# Patient Record
Sex: FEMALE | Race: WHITE | NOT HISPANIC OR LATINO | Employment: FULL TIME | URBAN - METROPOLITAN AREA
[De-identification: names, ages, dates, MRNs, and addresses within clinical notes are randomized per-mention and may not be internally consistent; named-entity substitution may affect disease eponyms.]

---

## 2017-05-18 ENCOUNTER — TELEPHONE (OUTPATIENT)
Dept: SURGERY | Facility: CLINIC | Age: 37
End: 2017-05-18

## 2017-05-30 ENCOUNTER — TELEPHONE (OUTPATIENT)
Dept: SURGERY | Facility: CLINIC | Age: 37
End: 2017-05-30

## 2017-05-31 ENCOUNTER — TELEPHONE (OUTPATIENT)
Dept: SURGERY | Facility: CLINIC | Age: 37
End: 2017-05-31

## 2017-06-19 ENCOUNTER — TELEPHONE (OUTPATIENT)
Dept: SURGERY | Facility: CLINIC | Age: 37
End: 2017-06-19

## 2017-06-19 ENCOUNTER — OFFICE VISIT (OUTPATIENT)
Dept: SURGERY | Facility: CLINIC | Age: 37
End: 2017-06-19

## 2017-06-19 VITALS
HEIGHT: 67 IN | SYSTOLIC BLOOD PRESSURE: 138 MMHG | BODY MASS INDEX: 40.4 KG/M2 | WEIGHT: 257.4 LBS | TEMPERATURE: 97.9 F | HEART RATE: 68 BPM | OXYGEN SATURATION: 98 % | DIASTOLIC BLOOD PRESSURE: 83 MMHG

## 2017-06-19 DIAGNOSIS — Z80.3 FH: BREAST CANCER: Primary | ICD-10-CM

## 2017-06-19 DIAGNOSIS — D68.51 FACTOR V LEIDEN CARRIER (HCC): ICD-10-CM

## 2017-06-19 DIAGNOSIS — N60.12 DIFFUSE CYSTIC MASTOPATHY, LEFT: ICD-10-CM

## 2017-06-19 PROCEDURE — 99243 OFF/OP CNSLTJ NEW/EST LOW 30: CPT | Performed by: SURGERY

## 2017-06-19 RX ORDER — FLUOXETINE 10 MG/1
CAPSULE ORAL
Refills: 3 | COMMUNITY
Start: 2017-03-22 | End: 2018-11-02

## 2017-06-19 NOTE — TELEPHONE ENCOUNTER
Pt informed of appts:  PETER 9-29-17 9:30 arrival Mammo/MRI  Pt instructed to bring at least 2 years of mammo films   Prescreened MRI/good on all questions  Dr. Bass 10-6-17 11:45 arrival

## 2017-06-19 NOTE — PROGRESS NOTES
Chief Complaint: Marianne Sawyer is a 37 y.o. female who was seen in consultation at the request of Francis Butterfield MD  for family history breast cancer    History of Present Illness:  Patient presents with family history breast cancer and previous breast biopsy.. She noted no new masses, skin changes, nipple discharge, nipple changes prior to her most recent imaging.  Her most recent imaging includes the following: [facility] bilateral screening mammogram September 20, 2016 fatty replaced breast, BI-RADS 1.    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  Cores returned as fibrocystic change with calcifications.    She has her uterus and ovaries removed for fibroids and bleeding, is postmenopausal, and has taken estradiol for the past 4 years since her 2012 hysterectomy.  Her family history includes the following:     She has one daughter, one son, 2 sisters, one maternal aunt, 4 paternal aunts.  Her maternal grandmother had breast cancer at age 70, her mother had breast cancer in her 50s.  One sister had breast cancer at age 47.  The other sister had breast cancer age 48.  One of her sisters is a patient of ours named Natalia Curry.  She is here for evaluation.    Review of Systems:  Review of Systems   Psychiatric/Behavioral: Positive for depression.   All other systems reviewed and are negative.       Past Medical and Surgical History:  Breast Biopsy History:  Patient has had the following breast biopsies:2/2013 A FIBROCYSTIC DISEASE WITH MICROCALCIFICATIONS.   Breast Cancer HIstory:  Patient does not have a past medical history of breast cancer.  Breast Operations, and year:  None   Obstetric/Gynecologic History:  Age menstrual periods began: 11 yrs old   Patient is postmenopausal due to removal of her uterus and both ovaries in the following year: 3/2012   Number of pregnancies: 2  Number of  live births: 2  Number of abortions or miscarriages: 0  Age of delivery of first child: 23 yrs old   Patient did not breast feed.  Length of time taking birth control pills: 19 yrs   Patient took hormone replacement during the following dates: Estradiol 4 yrs  Patient had uterus and ovaries removed     Past Surgical History:   Procedure Laterality Date   • BREAST BIOPSY      benign    •  SECTION     • DILATATION AND CURETTAGE     • LASER ABLATION      eye-bilateral   • LASIK     • SKIN LESION EXCISION      benign-back   • TOTAL ABDOMINAL HYSTERECTOMY WITH SALPINGO OOPHORECTOMY         Past Medical History:   Diagnosis Date   • Depression    • Factor 5 Leiden mutation, heterozygous    • Mastodynia    • Mood disorder        Prior Hospitalizations, other than for surgery or childbirth, and year:  None     Social History     Social History   • Marital status:      Spouse name: N/A   • Number of children: N/A   • Years of education: N/A     Occupational History   • Not on file.     Social History Main Topics   • Smoking status: Never Smoker   • Smokeless tobacco: Not on file   • Alcohol use Yes      Comment: beer occassionally   • Drug use: No   • Sexual activity: Not on file     Other Topics Concern   • Not on file     Social History Narrative   • No narrative on file     Patient is .  Patient is employed full time with the following occupation: RN  Patient drinks 1 servings of caffeine per day.    Family History:  Family History   Problem Relation Age of Onset   • Asthma Mother    • COPD Mother    • Deep vein thrombosis Mother    • Depression Mother    • Breast cancer Mother      50s   • Clotting disorder Father    • Deep vein thrombosis Father    • Hypertension Father    • Hyperlipidemia Father    • Clotting disorder Sister    • Depression Sister    • Breast cancer Sister 47   • Breast cancer Paternal Aunt      60-70s   • Breast cancer Maternal Grandmother      70s   • Esophageal cancer  "Maternal Grandfather      90s   • Lung cancer Paternal Grandmother    • Breast cancer Sister 48       Vital Signs:  /83 (BP Location: Left arm, Patient Position: Sitting, Cuff Size: Large Adult)  Pulse 68  Temp 97.9 °F (36.6 °C) (Temporal Artery )   Ht 67\" (170.2 cm)  Wt 257 lb 6.4 oz (117 kg)  SpO2 98%  BMI 40.31 kg/m2     Medications:    Current Outpatient Prescriptions:   •  FLUoxetine (PROzac) 10 MG capsule, TK 1 C PO ONCE D, Disp: , Rfl: 3     Allergies:  Allergies   Allergen Reactions   • Cleocin [Clindamycin Hcl] Anaphylaxis   • Sulfa Antibiotics Rash       Physical Examination:  /83 (BP Location: Left arm, Patient Position: Sitting, Cuff Size: Large Adult)  Pulse 68  Temp 97.9 °F (36.6 °C) (Temporal Artery )   Ht 67\" (170.2 cm)  Wt 257 lb 6.4 oz (117 kg)  SpO2 98%  BMI 40.31 kg/m2  General Appearance:  Patient is in no distress.  She is well kept and has an morbidly obese build.   Psychiatric:  Patient with appropriate mood and affect. Alert and oriented to self, time, and place.    Breast, RIGHT:  large sized, symmetric with the contralateral side.  Breast skin is without erythema, edema, rashes.  There are no visible abnormalities upon inspection during the arm-raising maneuver or with hands on hips in the sitting position. There is no nipple retraction, discharge or nipple/areolar skin changes.There are no masses palpable in the sitting or supine positions.    Breast, LEFT:  large sized, symmetric with the contralateral side.  Breast skin is without erythema, edema, rashes.  There are no visible abnormalities upon inspection during the arm-raising maneuver or with hands on hips in the sitting position. There is no nipple retraction, discharge or nipple/areolar skin changes.There are no masses palpable in the sitting or supine positions.    Lymphatic:  There is no axillary, cervical, infraclavicular, or supraclavicular adenopathy bilaterally.  Eyes:  Pupils are round and reactive to " light.  Cardiovascular:  Heart rate and rhythm are regular.  Respiratory:  Lungs are clear bilaterally with no crackles or wheezes in any lung field.  Gastrointestinal:  Abdomen is soft, nondistended, and nontender.    Musculoskeletal:  Good strength in all 4 extremities.   There is good range of motion in both shoulders.    Skin:  No new skin lesions or rashes on the skin excluding the breast (see breast exam above).        Imagin13  [facility] [place]  SCREENING BILATERAL MAMMOGRAM  GERALD, CATRACHITO R  Scattered fibrogladular densities.   Finding 1: round mass upper inner quadrant right breast.   Finding 2: pleomorphic calcifications upper outer quadrant of the left breast.   IMPRESSION:   Finding 1: requires additional evaluation.   Finding 2: require additional evaluation.   BIRADS Category 0    13 [facility] [place]  BILATERAL DIGITAL SPOTS-BILATERAL  GERALD, CATRACHITO R  Scattered fibrogladular densities.   Finding 1: additional evaluation round mass upper inner region right breast does persist. This could possibly represent an intramammary lymph node.   Finding 2: calcifications in the upper outer region of the left breast. Calcifiactuib have a suspicious appearance for malignancy and biopsy is recommended.   ULTRASOUND FINDINGS:   Finding 1: no discrete solid or cystic mass or area of abnormal shadowing. Probably benign. Recommend a right diagnostic mammogram in 6 months.   Finding 2: suspicious  BI-RADS Category 4    13  [facility]  MIKIE MAMMOGRAM DIAG GERALD, CATRACHITO   Scattered fibrogladular densities.   BI-RADS 2=Benign Findings     14  [facility]  UNI MARLEEN MAMMO GERALD, CATRACHITO  Scattered fibrogladular densities. BI-RADS 1-Negative     09/15/14  [facility]  MIKIE SCREENING MAMMO GERALD, CATRACHITO  PREDOMINANTLY FATTY BI-RADS 2    09/21/15  [facility]   SCREEN MAMMO   GERALD, CATRACHITO   BREAST COMPSITION: 1,  predominately fatty    09/28/16  [facility] MIKIE SCR MAMMO  CATRACHITO DUARTE   BREAST COMPOSITION: 1 PREDOMINATLEY FATTY        Pathology:  02/25/13 [facility]  STEREO MAMMOTOME LT   CATRACHITO DUARTE  An 11-gauge mammotome. Ultra-gel trenton system. Post-clip stereo views and mammogram revealed the clip to be in adequate position. Six month mammogram recommended for follow up.     03/04/13  [facility]   SURGICAL PATHOLOGY  CATRACHITO DUARTE.   FINAL PATHOLOGIC DIAGNOSIS  A FIBROCYSTIC DISEASE WITH MICROCALCIFICATIONS.     Procedures:      Assessment:   Diagnosis Plan   1. FH: breast cancer  MRI Breast Bilateral With & Without Contrast    Mammo Screening Bilateral With CAD    Ambulatory Referral to Hematology / Oncology   2. Diffuse cystic mastopathy, left  MRI Breast Bilateral With & Without Contrast    Mammo Screening Bilateral With CAD    Ambulatory Referral to Hematology / Oncology   3. Factor V Leiden carrier  MRI Breast Bilateral With & Without Contrast    Mammo Screening Bilateral With CAD    Ambulatory Referral to Hematology / Oncology   1-  Maternal grandmother age 70, mother age 55, sister #1 age 47, Sister #2 age 48.  Sister Natalia Curry, 1-8-68- is a patient of ours who had [facility] genetic testing that was a breast-ovarian cancer panel and negative for mutation     2-  Left breast calcifications March 2013, fibrocystic change with calcifications on stereotactic biopsy.    3-  Factor V heterozygote, inherited from her father.  Father had deep venous thrombosis.  Patient has not had a DVT or pulmonary embolus.  She did take heparin with a pregnancy.  She did not take heparin or Lovenox perioperatively surrounding her hysterectomy.      Plan:  We reviewed her imaging, history, exam, pathology, and family history together today. Her most significant risk factors for breast cancer include the following: family history  I calculated her lifetime risk of breast cancer  using the mastery of breast surgery  model as: 30-56%  Her 5 year risk for developing breast cancer is 2.8-8.7%    With a lifetime risk of breast cancer greater than 20%, the current recommendations for screening include annual mammography and annual MRI, with clinical examination. She is interested in pursuing this.    We also discussed that for a Josselin model 5 year risk greater than 1.7% or LCIS, and a life expectancy > 10 years, that we recommend risk reduction counseling with the medical oncologists about chemopreventive agents such as tamoxifen, raloxifene, or exemestane.  She was interested in pursuing this as well.    She is going to check her insurance and let us know whom she would like to see from medical oncology.    Son, her sister, who is a patient of ours,  is seeing Dr. Main from medical oncology, and he and his office are working on getting her into a clinical trial at [facility] based on her family history and lack of genetic mutation.    I have asked her to continue her self breast exam and to call us in the interim with any concerns and we'd be happy to see her back sooner.  Of note she does take estradiol at present.      Rocío Bass MD        We have spent 40 minutes in visit today, 25 in face to face .      Next Appointment:  Return for Next scheduled follow up, after imaging.      EMR Dragon/transcription disclaimer:    Much of this encounter note is an electronic transcription/translocation of spoken language to printed text.  The electronic translation of spoken language may permit erroneous, or at times, nonsensical words or phrases to be inadvertently transcribed.  Although I have reviewed the note from such areas, some may still exist.

## 2017-06-21 ENCOUNTER — TELEPHONE (OUTPATIENT)
Dept: SURGERY | Facility: CLINIC | Age: 37
End: 2017-06-21

## 2017-06-21 NOTE — TELEPHONE ENCOUNTER
Pt wants to have an appointment with .   [place].    Is office staff asked that I ask Dr MICHAEL if she will order Genetic testing?  His office will check with Dr Main when he returns and let us know about an appointment.    [email or web address]

## 2017-06-22 ENCOUNTER — TELEPHONE (OUTPATIENT)
Dept: SURGERY | Facility: CLINIC | Age: 37
End: 2017-06-22

## 2017-06-22 DIAGNOSIS — Z80.3 FAMILY HISTORY OF BREAST CANCER: Primary | ICD-10-CM

## 2017-06-23 ENCOUNTER — TELEPHONE (OUTPATIENT)
Dept: SURGERY | Facility: CLINIC | Age: 37
End: 2017-06-23

## 2017-06-23 NOTE — TELEPHONE ENCOUNTER
office requesting pt to have Genetic's testing done.   put order in, I have forward thru [facility] to have this scheduled with Genetic's at [facility]

## 2017-06-30 ENCOUNTER — APPOINTMENT (OUTPATIENT)
Dept: LAB | Facility: HOSPITAL | Age: 37
End: 2017-06-30

## 2017-06-30 ENCOUNTER — APPOINTMENT (OUTPATIENT)
Dept: ONCOLOGY | Facility: CLINIC | Age: 37
End: 2017-06-30

## 2017-07-24 ENCOUNTER — TELEPHONE (OUTPATIENT)
Dept: SURGERY | Facility: CLINIC | Age: 37
End: 2017-07-24

## 2017-07-24 NOTE — TELEPHONE ENCOUNTER
"  Followed up with  office today regarding  Ms. Sawyer\"s appointment & ,was informed by his office staff they are unable to make any appointment for Ms. Sawyer at this time, unless she has a cancer diagnosis or pending Genetic which is scheduled for 8-15-17    We see patient back 10-6-17 for a f/u mammo + MRI    Thanks  K      "

## 2017-08-15 ENCOUNTER — CLINICAL SUPPORT (OUTPATIENT)
Dept: OTHER | Facility: HOSPITAL | Age: 37
End: 2017-08-15

## 2017-08-15 DIAGNOSIS — Z80.3 FAMILY HISTORY OF MALIGNANT NEOPLASM OF BREAST: Primary | ICD-10-CM

## 2017-08-15 PROCEDURE — G0463 HOSPITAL OUTPT CLINIC VISIT: HCPCS

## 2017-09-15 ENCOUNTER — TELEPHONE (OUTPATIENT)
Dept: SURGERY | Facility: CLINIC | Age: 37
End: 2017-09-15

## 2017-09-15 NOTE — TELEPHONE ENCOUNTER
Precert obtained for MRI 9-29-17 avlid until 12-14-17 [facility] as per Zenaida LARA [id number]

## 2017-09-29 ENCOUNTER — HOSPITAL ENCOUNTER (OUTPATIENT)
Dept: MRI IMAGING | Facility: HOSPITAL | Age: 37
Discharge: HOME | End: 2017-09-29
Attending: SURGERY | Admitting: SURGERY

## 2017-09-29 ENCOUNTER — HOSPITAL ENCOUNTER (OUTPATIENT)
Dept: MAMMOGRAPHY | Facility: HOSPITAL | Age: 37
Discharge: HOME | End: 2017-09-29
Attending: SURGERY

## 2017-09-29 DIAGNOSIS — Z80.3 FH: BREAST CANCER: ICD-10-CM

## 2017-09-29 DIAGNOSIS — N60.12 DIFFUSE CYSTIC MASTOPATHY, LEFT: ICD-10-CM

## 2017-09-29 DIAGNOSIS — D68.51 FACTOR V LEIDEN CARRIER (HCC): ICD-10-CM

## 2017-09-29 PROCEDURE — 25510000001 GADOBENATE DIMEGLUMINE 529 MG/ML SOLUTION: Performed by: SURGERY

## 2017-09-29 PROCEDURE — G0202 SCR MAMMO BI INCL CAD: HCPCS

## 2017-09-29 PROCEDURE — C8908 MRI W/O FOL W/CONT, BREAST,: HCPCS

## 2017-09-29 PROCEDURE — A9577 INJ MULTIHANCE: HCPCS | Performed by: SURGERY

## 2017-09-29 PROCEDURE — 0159T HC MRI BREAST COMPUTER ANALYSIS: CPT

## 2017-09-29 RX ADMIN — GADOBENATE DIMEGLUMINE 20 ML: 529 INJECTION, SOLUTION INTRAVENOUS at 10:46

## 2017-10-03 ENCOUNTER — TELEPHONE (OUTPATIENT)
Dept: SURGERY | Facility: CLINIC | Age: 37
End: 2017-10-03

## 2017-10-03 DIAGNOSIS — R92.8 ABNORMAL MRI, BREAST: Primary | ICD-10-CM

## 2017-10-03 NOTE — TELEPHONE ENCOUNTER
Screening mammogram bilateral [facility] September 29, 2017.  BI-RADS 2.  Scattered fibroglandular densities.  MRI breast bilateral [facility] September 29, 2017 Dr. Hossein David.  Area of enhancement of questionable character with ill-defined marked neurologic features at the 4:00 right breast, 13 cm from the nipple location correlation with diagnostic mammogram and ultrasound.  No suspicious findings in the left breast.  BI-RADS 0.    I will arrange for a right mammogram and ultrasound prior to her visit back with me.    October 6, 2017 right diagnostic mammogram and right breast ultrasound.  Attention to the 4 o'clock position.  No architectural distortion, mass or area of increased density has developed.  Scattered fiber glandular densities.  Ultrasound in her inferior right breast no cystic or solid mass identified.  At the 3:00, 13 cm from the nipple location a small area of acoustic shadowing noted secondary to normal soft tissue interface.  BI-RADS 2 with return to normal routine mammogram and MRI.

## 2017-10-04 ENCOUNTER — TELEPHONE (OUTPATIENT)
Dept: SURGERY | Facility: CLINIC | Age: 37
End: 2017-10-04

## 2017-10-06 ENCOUNTER — TELEPHONE (OUTPATIENT)
Dept: SURGERY | Facility: CLINIC | Age: 37
End: 2017-10-06

## 2017-10-06 ENCOUNTER — HOSPITAL ENCOUNTER (OUTPATIENT)
Dept: ULTRASOUND IMAGING | Facility: HOSPITAL | Age: 37
Discharge: HOME | End: 2017-10-06
Attending: SURGERY

## 2017-10-06 ENCOUNTER — HOSPITAL ENCOUNTER (OUTPATIENT)
Dept: MAMMOGRAPHY | Facility: HOSPITAL | Age: 37
Discharge: HOME | End: 2017-10-06
Attending: SURGERY | Admitting: SURGERY

## 2017-10-06 DIAGNOSIS — R92.8 ABNORMAL MRI, BREAST: ICD-10-CM

## 2017-10-06 PROCEDURE — 76642 ULTRASOUND BREAST LIMITED: CPT

## 2017-10-06 PROCEDURE — G0206 DX MAMMO INCL CAD UNI: HCPCS

## 2017-10-06 NOTE — TELEPHONE ENCOUNTER
Dr Bass wants Dr David to  Review Mammo + U/s    Per [place] she will have him do this Monday.  She is to contact Elena with any questions.  kdl

## 2017-10-09 ENCOUNTER — TELEPHONE (OUTPATIENT)
Dept: SURGERY | Facility: CLINIC | Age: 37
End: 2017-10-09

## 2017-10-09 NOTE — TELEPHONE ENCOUNTER
Dr. David reviewed 10/6/17 imaging, agrees with Dr. Young, patient may return to routine mammogram and breast MRI.     Lml

## 2017-10-20 ENCOUNTER — OFFICE VISIT (OUTPATIENT)
Dept: SURGERY | Facility: CLINIC | Age: 37
End: 2017-10-20

## 2017-10-20 VITALS
SYSTOLIC BLOOD PRESSURE: 130 MMHG | HEIGHT: 67 IN | WEIGHT: 259.8 LBS | OXYGEN SATURATION: 98 % | HEART RATE: 91 BPM | DIASTOLIC BLOOD PRESSURE: 84 MMHG | BODY MASS INDEX: 40.78 KG/M2

## 2017-10-20 DIAGNOSIS — D68.51 FACTOR V LEIDEN CARRIER (HCC): ICD-10-CM

## 2017-10-20 DIAGNOSIS — Z80.3 FH: BREAST CANCER: Primary | ICD-10-CM

## 2017-10-20 DIAGNOSIS — N60.12 FIBROCYSTIC DISEASE OF LEFT BREAST: ICD-10-CM

## 2017-10-20 PROCEDURE — 99213 OFFICE O/P EST LOW 20 MIN: CPT | Performed by: SURGERY

## 2017-10-20 NOTE — PROGRESS NOTES
Chief Complaint: Marianne Sawyer is a 37 y.o. female who was seen in consultation at the request of Francis Butterfield MD  for family history breast cancer    History of Present Illness:  Patient presents with family history breast cancer and previous breast biopsy.. She noted no new masses, skin changes, nipple discharge, nipple changes prior to her most recent imaging.  Her most recent imaging includes the following: [facility] bilateral screening mammogram September 20, 2016 fatty replaced breast, BI-RADS 1.    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  Cores returned as fibrocystic change with calcifications.    She has her uterus and ovaries removed for fibroids and bleeding, is postmenopausal, and has taken estradiol for the past 4 years since her 2012 hysterectomy.  Her family history includes the following:     She has one daughter, one son, 2 sisters, one maternal aunt, 4 paternal aunts.  Her maternal grandmother had breast cancer at age 70, her mother had breast cancer in her 50s.  One sister had breast cancer at age 47.  The other sister had breast cancer age 48.  One of her sisters is a patient of ours named Natalia Patricio.    Interval History:  In the interim,  Marianne Sawyer  has done well.  She has noted no changes in her breast exam. No new masses, skin changes, nipple changes, nipple discharge either breast.   She saw genetics and they felt that she did not need testing since her sister had tested negative for mutation.    Her most recent imaging includes the following:  Screening mammogram bilateral [facility] September 29, 2017.  BI-RADS 2.  Scattered fibroglandular densities.  MRI breast bilateral [facility] September 29, 2017 Dr. Hossein David.  Area of enhancement of questionable character with ill-defined marked neurologic features at the 4:00 right  breast, 13 cm from the nipple location correlation with diagnostic mammogram and ultrasound.  No suspicious findings in the left breast.  BI-RADS 0.     2017 right diagnostic mammogram and right breast ultrasound.  Attention to the 4 o'clock position.  No architectural distortion, mass or area of increased density has developed.  Scattered fiber glandular densities.  Ultrasound in her inferior right breast no cystic or solid mass identified.  At the 3:00, 13 cm from the nipple location a small area of acoustic shadowing noted secondary to normal soft tissue interface.  BI-RADS 2 with return to normal routine mammogram and MRI    She stopped taking estrogen altogether in 2017.    She has gained 2 #.    Review of Systems:  Review of Systems   Psychiatric/Behavioral: Positive for depression.   All other systems reviewed and are negative.       Past Medical and Surgical History:  Breast Biopsy History:  Patient has had the following breast biopsies:2013 A FIBROCYSTIC DISEASE WITH MICROCALCIFICATIONS.   Breast Cancer HIstory:  Patient does not have a past medical history of breast cancer.  Breast Operations, and year:  None   Obstetric/Gynecologic History:  Age menstrual periods began: 11 yrs old   Patient is postmenopausal due to removal of her uterus and both ovaries in the following year: 3/2012   Number of pregnancies: 2  Number of live births: 2  Number of abortions or miscarriages: 0  Age of delivery of first child: 23 yrs old   Patient did not breast feed.  Length of time taking birth control pills: 19 yrs   Patient took hormone replacement during the following dates: Estradiol 4 yrs  Patient had uterus and ovaries removed     Past Surgical History:   Procedure Laterality Date   • BREAST BIOPSY      benign    •  SECTION     • DILATATION AND CURETTAGE     • LASER ABLATION      eye-bilateral   • LASIK     • SKIN LESION EXCISION      benign-back   • TOTAL ABDOMINAL HYSTERECTOMY WITH  "SALPINGO OOPHORECTOMY         Past Medical History:   Diagnosis Date   • Depression    • Factor 5 Leiden mutation, heterozygous    • Mastodynia    • Mood disorder        Prior Hospitalizations, other than for surgery or childbirth, and year:  None     Social History     Social History   • Marital status:      Spouse name: N/A   • Number of children: N/A   • Years of education: N/A     Occupational History   • Not on file.     Social History Main Topics   • Smoking status: Never Smoker   • Smokeless tobacco: Not on file   • Alcohol use Yes      Comment: beer occassionally   • Drug use: No   • Sexual activity: Not on file     Other Topics Concern   • Not on file     Social History Narrative     Patient is .  Patient is employed full time with the following occupation: RN  Patient drinks 1 servings of caffeine per day.    Family History:  Family History   Problem Relation Age of Onset   • Asthma Mother    • COPD Mother    • Deep vein thrombosis Mother    • Depression Mother    • Breast cancer Mother      50s   • Clotting disorder Father    • Deep vein thrombosis Father    • Hypertension Father    • Hyperlipidemia Father    • Clotting disorder Sister    • Depression Sister    • Breast cancer Sister 47   • Breast cancer Paternal Aunt      60-70s   • Breast cancer Maternal Grandmother      70s   • Esophageal cancer Maternal Grandfather      90s   • Lung cancer Paternal Grandmother    • Breast cancer Sister 48       Vital Signs:  /84  Pulse 91  Ht 67\" (170.2 cm)  Wt 259 lb 12.8 oz (118 kg)  LMP  (LMP Unknown)  SpO2 98%  BMI 40.69 kg/m2     Medications:    Current Outpatient Prescriptions:   •  FLUoxetine (PROzac) 10 MG capsule, TK 1 C PO ONCE D, Disp: , Rfl: 3     Allergies:  Allergies   Allergen Reactions   • Cleocin [Clindamycin Hcl] Anaphylaxis   • Sulfa Antibiotics Rash       Physical Examination:  /84  Pulse 91  Ht 67\" (170.2 cm)  Wt 259 lb 12.8 oz (118 kg)  LMP  (LMP Unknown)  " SpO2 98%  BMI 40.69 kg/m2  General Appearance:  Patient is in no distress.  She is well kept and has an morbidly obese build.   Psychiatric:  Patient with appropriate mood and affect. Alert and oriented to self, time, and place.    Breast, RIGHT:  large sized, symmetric with the contralateral side.  Breast skin is without erythema, edema, rashes.  There are no visible abnormalities upon inspection during the arm-raising maneuver or with hands on hips in the sitting position. There is no nipple retraction, discharge or nipple/areolar skin changes.There are no masses palpable in the sitting or supine positions.    Breast, LEFT:  large sized, symmetric with the contralateral side.  Breast skin is without erythema, edema, rashes.  There are no visible abnormalities upon inspection during the arm-raising maneuver or with hands on hips in the sitting position. There is no nipple retraction, discharge or nipple/areolar skin changes.There are no masses palpable in the sitting or supine positions.    Lymphatic:  There is no axillary, cervical, infraclavicular, or supraclavicular adenopathy bilaterally.  Eyes:  Pupils are round and reactive to light.  Cardiovascular:  Heart rate and rhythm are regular.  Respiratory:  Lungs are clear bilaterally with no crackles or wheezes in any lung field.  Gastrointestinal:  Abdomen is soft, nondistended, and nontender.    Musculoskeletal:  Good strength in all 4 extremities.   There is good range of motion in both shoulders.    Skin:  No new skin lesions or rashes on the skin excluding the breast (see breast exam above).        Imagin13  [facility], [place]  SCREENING BILATERAL MAMMOGRAM  CATRACHITO DUARTE  Scattered fibrogladular densities.   Finding 1: round mass upper inner quadrant right breast.   Finding 2: pleomorphic calcifications upper outer quadrant of the left breast.   IMPRESSION:   Finding 1: requires additional evaluation.   Finding 2: require additional  evaluation.   BIRADS Category 0    02/25/13 [facility], [place]  BILATERAL DIGITAL SPOTS-BILATERAL  CATRACHITO DUARTE  Scattered fibrogladular densities.   Finding 1: additional evaluation round mass upper inner region right breast does persist. This could possibly represent an intramammary lymph node.   Finding 2: calcifications in the upper outer region of the left breast. Calcifiactuib have a suspicious appearance for malignancy and biopsy is recommended.   ULTRASOUND FINDINGS:   Finding 1: no discrete solid or cystic mass or area of abnormal shadowing. Probably benign. Recommend a right diagnostic mammogram in 6 months.   Finding 2: suspicious  BI-RADS Category 4    09/09/13  [facility]  MIKIE MAMMOGRAM DIAG GERALD, CATRACHITO   Scattered fibrogladular densities.   BI-RADS 2=Benign Findings     01/13/14  [facility]  UNI MARLEEN MAMMO GERALD, CATRACHITO  Scattered fibrogladular densities. BI-RADS 1-Negative     09/15/14  [facility]  MIKIE SCREENING MAMMO GERALD, CATRACHITO  PREDOMINANTLY FATTY BI-RADS 2    09/21/15  [facility]   SCREEN MAMMO   GERALD, CATRACHITO   BREAST COMPSITION: 1, predominately fatty    09/28/16  [facility] MIKIE SCR MAMMO  GERALD, CATRACHITO   BREAST COMPOSITION: 1 PREDOMINATLEY FATTY        Pathology:  02/25/13 [facility], [place]  STEREO MAMMOTOME LT   CATRACHITO DUARTE  An 11-gauge mammotome. Ultra-gel trenton system. Post-clip stereo views and mammogram revealed the clip to be in adequate position. Six month mammogram recommended for follow up.     03/04/13  [facility]   SURGICAL PATHOLOGY  CATRACHITO DUARTE.   FINAL PATHOLOGIC DIAGNOSIS  A FIBROCYSTIC DISEASE WITH MICROCALCIFICATIONS.     Procedures:      Assessment:   Diagnosis Plan   1. FH: breast cancer  Ambulatory Referral to Hematology / Oncology    Mammo Screening Bilateral With CAD    MRI Breast Bilateral With & Without Contrast   2. Fibrocystic disease of left breast   Ambulatory Referral to Hematology / Oncology    Mammo Screening Bilateral With CAD    MRI Breast Bilateral With & Without Contrast   3. Factor V Leiden carrier  Ambulatory Referral to Hematology / Oncology    Mammo Screening Bilateral With CAD    MRI Breast Bilateral With & Without Contrast        1-  Maternal grandmother age 70, mother age 55, sister #1 age 47, Sister #2 age 48.  Sister Natalia Curry, [id number]- is a patient of ours who had [facility] genetic testing that was a breast-ovarian cancer panel and negative for mutation   - patient saw genetics per Dr Main, and did not have testing since her family member had.  -stopped estradiol 7-2017    2-  Left breast calcifications March 2013, fibrocystic change with calcifications on stereotactic biopsy.    3-  Factor V heterozygote, inherited from her father.  Father had deep venous thrombosis.  Patient has not had a DVT or pulmonary embolus.  She did take heparin with a pregnancy.  She did not take heparin or Lovenox perioperatively surrounding her hysterectomy.      Plan:  We reviewed her interval history, imaging, and imaging reports together today.  I reviewed her mammogram, MRI, and then targeted mammogram and ultrasound which were all benign.    Previously,I calculated her lifetime risk of breast cancer using the mastery of breast surgery  model as: 30-56%  Her 5 year risk for developing breast cancer is 2.8-8.7%    With a lifetime risk of breast cancer greater than 20%, the current recommendations for screening include annual mammography and annual MRI, with clinical examination.   We are currently in surveillance.    We also discussed that for a Josselin model 5 year risk greater than 1.7% or LCIS, and a life expectancy > 10 years, that we recommend risk reduction counseling with the medical oncologists about chemopreventive agents such as tamoxifen, raloxifene, or exemestane.  She was interested in pursuing this as well, and would like to see   Etelvina.      Sno, her sister, who is a patient of ours, sees Dr. Main from medical oncology, and he and his office are working on getting her into a clinical trial at [facility] based on her family history and lack of genetic mutation.    Her next mammogram and MRI are due at [facility] October 1, 2018.  I will see her back after  I have asked her to continue her self breast exam and to call us in the interim with any concerns and we'd be happy to see her back sooner.        Rocío Bass MD        We have spent 15 minutes in visit today, 9 in face to face .      Next Appointment:  Return for Next scheduled follow up, after imaging.      EMR Dragon/transcription disclaimer:    Much of this encounter note is an electronic transcription/translocation of spoken language to printed text.  The electronic translation of spoken language may permit erroneous, or at times, nonsensical words or phrases to be inadvertently transcribed.  Although I have reviewed the note from such areas, some may still exist.

## 2017-10-20 NOTE — PROGRESS NOTES
Chief Complaint: Marianne Sawyer is a 37 y.o. female who was seen in consultation at the request of Francis Butterfield MD  for family history breast cancer    History of Present Illness:  Patient presents with family history breast cancer and previous breast biopsy.. She noted no new masses, skin changes, nipple discharge, nipple changes prior to her most recent imaging.  Her most recent imaging includes the following: [facility] bilateral screening mammogram September 20, 2016 fatty replaced breast, BI-RADS 1.    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  Cores returned as fibrocystic change with calcifications.    She has her uterus and ovaries removed for fibroids and bleeding, is postmenopausal, and has taken estradiol for the past 4 years since her 2012 hysterectomy.  Her family history includes the following:     She has one daughter, one son, 2 sisters, one maternal aunt, 4 paternal aunts.  Her maternal grandmother had breast cancer at age 70, her mother had breast cancer in her 50s.  One sister had breast cancer at age 47.  The other sister had breast cancer age 48.  One of her sisters is a patient of ours named Natalia Curry.  She is here for evaluation.    Review of Systems:  Review of Systems   Psychiatric/Behavioral: Positive for depression.   All other systems reviewed and are negative.       Past Medical and Surgical History:  Breast Biopsy History:  Patient has had the following breast biopsies:2/2013 A FIBROCYSTIC DISEASE WITH MICROCALCIFICATIONS.   Breast Cancer HIstory:  Patient does not have a past medical history of breast cancer.  Breast Operations, and year:  None   Obstetric/Gynecologic History:  Age menstrual periods began: 11 yrs old   Patient is postmenopausal due to removal of her uterus and both ovaries in the following year: 3/2012   Number of pregnancies: 2  Number of  live births: 2  Number of abortions or miscarriages: 0  Age of delivery of first child: 23 yrs old   Patient did not breast feed.  Length of time taking birth control pills: 19 yrs   Patient took hormone replacement during the following dates: Estradiol 4 yrs  Patient had uterus and ovaries removed     Past Surgical History:   Procedure Laterality Date   • BREAST BIOPSY      benign    •  SECTION     • DILATATION AND CURETTAGE     • LASER ABLATION      eye-bilateral   • LASIK     • SKIN LESION EXCISION      benign-back   • TOTAL ABDOMINAL HYSTERECTOMY WITH SALPINGO OOPHORECTOMY         Past Medical History:   Diagnosis Date   • Depression    • Factor 5 Leiden mutation, heterozygous    • Mastodynia    • Mood disorder        Prior Hospitalizations, other than for surgery or childbirth, and year:  None     Social History     Social History   • Marital status:      Spouse name: N/A   • Number of children: N/A   • Years of education: N/A     Occupational History   • Not on file.     Social History Main Topics   • Smoking status: Never Smoker   • Smokeless tobacco: Not on file   • Alcohol use Yes      Comment: beer occassionally   • Drug use: No   • Sexual activity: Not on file     Other Topics Concern   • Not on file     Social History Narrative     Patient is .  Patient is employed full time with the following occupation: RN  Patient drinks 1 servings of caffeine per day.    Family History:  Family History   Problem Relation Age of Onset   • Asthma Mother    • COPD Mother    • Deep vein thrombosis Mother    • Depression Mother    • Breast cancer Mother      50s   • Clotting disorder Father    • Deep vein thrombosis Father    • Hypertension Father    • Hyperlipidemia Father    • Clotting disorder Sister    • Depression Sister    • Breast cancer Sister 47   • Breast cancer Paternal Aunt      60-70s   • Breast cancer Maternal Grandmother      70s   • Esophageal cancer Maternal Grandfather       "90s   • Lung cancer Paternal Grandmother    • Breast cancer Sister 48       Vital Signs:  /84  Pulse 91  Ht 67\" (170.2 cm)  Wt 259 lb 12.8 oz (118 kg)  LMP  (LMP Unknown)  SpO2 98%  BMI 40.69 kg/m2     Medications:    Current Outpatient Prescriptions:   •  FLUoxetine (PROzac) 10 MG capsule, TK 1 C PO ONCE D, Disp: , Rfl: 3     Allergies:  Allergies   Allergen Reactions   • Cleocin [Clindamycin Hcl] Anaphylaxis   • Sulfa Antibiotics Rash       Physical Examination:  /84  Pulse 91  Ht 67\" (170.2 cm)  Wt 259 lb 12.8 oz (118 kg)  LMP  (LMP Unknown)  SpO2 98%  BMI 40.69 kg/m2  General Appearance:  Patient is in no distress.  She is well kept and has an morbidly obese build.   Psychiatric:  Patient with appropriate mood and affect. Alert and oriented to self, time, and place.    Breast, RIGHT:  large sized, symmetric with the contralateral side.  Breast skin is without erythema, edema, rashes.  There are no visible abnormalities upon inspection during the arm-raising maneuver or with hands on hips in the sitting position. There is no nipple retraction, discharge or nipple/areolar skin changes.There are no masses palpable in the sitting or supine positions.    Breast, LEFT:  large sized, symmetric with the contralateral side.  Breast skin is without erythema, edema, rashes.  There are no visible abnormalities upon inspection during the arm-raising maneuver or with hands on hips in the sitting position. There is no nipple retraction, discharge or nipple/areolar skin changes.There are no masses palpable in the sitting or supine positions.    Lymphatic:  There is no axillary, cervical, infraclavicular, or supraclavicular adenopathy bilaterally.  Eyes:  Pupils are round and reactive to light.  Cardiovascular:  Heart rate and rhythm are regular.  Respiratory:  Lungs are clear bilaterally with no crackles or wheezes in any lung field.  Gastrointestinal:  Abdomen is soft, nondistended, and " nontender.    Musculoskeletal:  Good strength in all 4 extremities.   There is good range of motion in both shoulders.    Skin:  No new skin lesions or rashes on the skin excluding the breast (see breast exam above).        Imagin13  [facility], [place]  SCREENING BILATERAL MAMMOGRAM  GERALD, CATRACHITO R  Scattered fibrogladular densities.   Finding 1: round mass upper inner quadrant right breast.   Finding 2: pleomorphic calcifications upper outer quadrant of the left breast.   IMPRESSION:   Finding 1: requires additional evaluation.   Finding 2: require additional evaluation.   BIRADS Category 0    13 [facility]  BILATERAL DIGITAL SPOTS-BILATERAL  GERALD, CATRACHITO R  Scattered fibrogladular densities.   Finding 1: additional evaluation round mass upper inner region right breast does persist. This could possibly represent an intramammary lymph node.   Finding 2: calcifications in the upper outer region of the left breast. Calcifiactuib have a suspicious appearance for malignancy and biopsy is recommended.   ULTRASOUND FINDINGS:   Finding 1: no discrete solid or cystic mass or area of abnormal shadowing. Probably benign. Recommend a right diagnostic mammogram in 6 months.   Finding 2: suspicious  BI-RADS Category 4    13  [facility]  MIKIE MAMMOGRAM DIAG GERALD, CATRACHITO   Scattered fibrogladular densities.   BI-RADS 2=Benign Findings     14  [facility]  UNI MARLEEN MAMMO GERALD, CATRACHITO  Scattered fibrogladular densities. BI-RADS 1-Negative     09/15/14  [facility]  MIKIE SCREENING MAMMO GERALD, CATRACHITO  PREDOMINANTLY FATTY BI-RADS 2    09/21/15  [facility]   SCREEN MAMMO   GERALD, CATRACHITO   BREAST COMPSITION: 1, predominately fatty    16  [facility] MIKIE SCR MAMMO  GERALD, CATRACHITO   BREAST COMPOSITION: 1 PREDOMINATLEY FATTY    Screening mammogram bilateral [facility] 2017.   BI-RADS 2.  Scattered fibroglandular densities.  MRI breast bilateral [facility] September 29, 2017 Dr. Hossein David.  Area of enhancement of questionable character with ill-defined marked neurologic features at the 4:00 right breast, 13 cm from the nipple location correlation with diagnostic mammogram and ultrasound.  No suspicious findings in the left breast.  BI-RADS 0.     I will arrange for a right mammogram and ultrasound prior to her visit back with me.     October 6, 2017 right diagnostic mammogram and right breast ultrasound.  Attention to the 4 o'clock position.  No architectural distortion, mass or area of increased density has developed.  Scattered fiber glandular densities.  Ultrasound in her inferior right breast no cystic or solid mass identified.  At the 3:00, 13 cm from the nipple location a small area of acoustic shadowing noted secondary to normal soft tissue interface.  BI-RADS 2 with return to normal routine mammogram and MRI.    Pathology:  02/25/13 [facility] KAY MORSE  STEREO MAMMOTOME LT   CATRACHITO DUARTE  An 11-gauge mammotome. Ultra-gel trenton system. Post-clip stereo views and mammogram revealed the clip to be in adequate position. Six month mammogram recommended for follow up.     03/04/13  [facility]   SURGICAL PATHOLOGY  CATRACHITO DUARTE.   FINAL PATHOLOGIC DIAGNOSIS  A FIBROCYSTIC DISEASE WITH MICROCALCIFICATIONS.       Ordered genetics per Dr Gotti request.      Procedures:      Assessment:  No diagnosis found.1-  Maternal grandmother age 70, mother age 55, sister #1 age 47, Sister #2 age 48.  Sister Natalia Curry, 1-8-68- is a patient of ours who had [facility] genetic testing that was a breast-ovarian cancer panel and negative for mutation     2-  Left breast calcifications March 2013, fibrocystic change with calcifications on stereotactic biopsy.    3-  Factor V heterozygote, inherited from her father.  Father had deep venous thrombosis.   Patient has not had a DVT or pulmonary embolus.  She did take heparin with a pregnancy.  She did not take heparin or Lovenox perioperatively surrounding her hysterectomy.      Plan:  We reviewed her imaging, history, exam, pathology, and family history together today. Her most significant risk factors for breast cancer include the following: family history  I calculated her lifetime risk of breast cancer using the mastery of breast surgery  model as: 30-56%  Her 5 year risk for developing breast cancer is 2.8-8.7%    With a lifetime risk of breast cancer greater than 20%, the current recommendations for screening include annual mammography and annual MRI, with clinical examination. She is interested in pursuing this.    We also discussed that for a Josselin model 5 year risk greater than 1.7% or LCIS, and a life expectancy > 10 years, that we recommend risk reduction counseling with the medical oncologists about chemopreventive agents such as tamoxifen, raloxifene, or exemestane.  She was interested in pursuing this as well.    She is going to check her insurance and let us know whom she would like to see from medical oncology.    Son, her sister, who is a patient of ours,  is seeing Dr. Main from medical oncology, and he and his office are working on getting her into a clinical trial at [facility] based on her family history and lack of genetic mutation.    I have asked her to continue her self breast exam and to call us in the interim with any concerns and we'd be happy to see her back sooner.  Of note she does take estradiol at present.      Elena Alvarez MA        We have spent 40 minutes in visit today, 25 in face to face .      Next Appointment:  No Follow-up on file.      EMR Dragon/transcription disclaimer:    Much of this encounter note is an electronic transcription/translocation of spoken language to printed text.  The electronic translation of spoken language may permit erroneous, or at times,  nonsensical words or phrases to be inadvertently transcribed.  Although I have reviewed the note from such areas, some may still exist.

## 2017-10-24 ENCOUNTER — TELEPHONE (OUTPATIENT)
Dept: SURGERY | Facility: CLINIC | Age: 37
End: 2017-10-24

## 2017-10-24 NOTE — TELEPHONE ENCOUNTER
Called  office spoke with Lovely, to schedule Consult for Ms. Sawyer.  Per Lovely I need to speak with Trinidad who is out until Friday.    They will call me back.    nirali

## 2017-10-27 ENCOUNTER — TELEPHONE (OUTPATIENT)
Dept: SURGERY | Facility: CLINIC | Age: 37
End: 2017-10-27

## 2017-10-27 NOTE — TELEPHONE ENCOUNTER
Scheduled Consult with Dr. Gladis Park (works with Dr Main)  11-6-17 @ 11:00    [address] [facility] [address] (2nd floor-only office on 2nd floor)  [place] [address]  [phone]     for pt to call    * is part time now, he is focusing on  Brain Tumors- this is why patient is scheduled with his partner.    jaredl      Pt called and confirmed appt  nirali

## 2018-09-05 ENCOUNTER — TELEPHONE (OUTPATIENT)
Dept: SURGERY | Facility: CLINIC | Age: 38
End: 2018-09-05

## 2018-09-05 NOTE — TELEPHONE ENCOUNTER
LFT VM    SCHEDULED MRI & [facility] 10.2.18 ARRIVE 9:30AM    F/U SCHEDULED 10.19.18 ARRIVE 12:45PM    RR

## 2018-09-24 ENCOUNTER — TELEPHONE (OUTPATIENT)
Dept: SURGERY | Facility: CLINIC | Age: 38
End: 2018-09-24

## 2018-10-02 ENCOUNTER — HOSPITAL ENCOUNTER (OUTPATIENT)
Dept: MRI IMAGING | Facility: HOSPITAL | Age: 38
Discharge: HOME | End: 2018-10-02
Attending: SURGERY

## 2018-10-02 ENCOUNTER — HOSPITAL ENCOUNTER (OUTPATIENT)
Dept: MAMMOGRAPHY | Facility: HOSPITAL | Age: 38
Discharge: HOME | End: 2018-10-02
Attending: SURGERY | Admitting: SURGERY

## 2018-10-02 DIAGNOSIS — Z80.3 FH: BREAST CANCER: ICD-10-CM

## 2018-10-02 DIAGNOSIS — N60.12 FIBROCYSTIC DISEASE OF LEFT BREAST: ICD-10-CM

## 2018-10-02 DIAGNOSIS — D68.51 FACTOR V LEIDEN CARRIER (HCC): ICD-10-CM

## 2018-10-02 PROCEDURE — C8908 MRI W/O FOL W/CONT, BREAST,: HCPCS

## 2018-10-02 PROCEDURE — A9577 INJ MULTIHANCE: HCPCS | Performed by: SURGERY

## 2018-10-02 PROCEDURE — 77067 SCR MAMMO BI INCL CAD: CPT

## 2018-10-02 PROCEDURE — 0159T HC MRI BREAST COMPUTER ANALYSIS: CPT

## 2018-10-02 PROCEDURE — 0 GADOBENATE DIMEGLUMINE 529 MG/ML SOLUTION: Performed by: SURGERY

## 2018-10-02 RX ADMIN — GADOBENATE DIMEGLUMINE 20 ML: 529 INJECTION, SOLUTION INTRAVENOUS at 12:47

## 2018-10-03 ENCOUNTER — TELEPHONE (OUTPATIENT)
Dept: SURGERY | Facility: CLINIC | Age: 38
End: 2018-10-03

## 2018-10-03 NOTE — TELEPHONE ENCOUNTER
Bilateral screening mammogram dated October 2, 2018.  The parenchyma is fatty-replaced.  No evidence of malignancy in either breast.  BI-RADS 1    Bilateral breast MRI October 2, 2018 [facility].  No findings suspicious for cancer in either breast BI-RADS 1.

## 2018-10-22 ENCOUNTER — HOSPITAL ENCOUNTER (OUTPATIENT)
Dept: ULTRASOUND IMAGING | Facility: HOSPITAL | Age: 38
Discharge: HOME | End: 2018-10-22
Attending: SURGERY | Admitting: SURGERY

## 2018-10-22 ENCOUNTER — OFFICE VISIT (OUTPATIENT)
Dept: SURGERY | Facility: CLINIC | Age: 38
End: 2018-10-22

## 2018-10-22 VITALS
HEART RATE: 99 BPM | DIASTOLIC BLOOD PRESSURE: 78 MMHG | HEIGHT: 67 IN | WEIGHT: 260 LBS | OXYGEN SATURATION: 97 % | BODY MASS INDEX: 40.81 KG/M2 | SYSTOLIC BLOOD PRESSURE: 126 MMHG

## 2018-10-22 DIAGNOSIS — N60.19 FIBROCYSTIC BREAST DISEASE (FCBD), UNSPECIFIED LATERALITY: ICD-10-CM

## 2018-10-22 DIAGNOSIS — D68.51 FACTOR V LEIDEN CARRIER (HCC): ICD-10-CM

## 2018-10-22 DIAGNOSIS — N63.0 LUMP OR MASS IN BREAST: ICD-10-CM

## 2018-10-22 DIAGNOSIS — N63.0 LUMP OR MASS IN BREAST: Primary | ICD-10-CM

## 2018-10-22 DIAGNOSIS — Z80.3 FH: BREAST CANCER: ICD-10-CM

## 2018-10-22 PROCEDURE — 76642 ULTRASOUND BREAST LIMITED: CPT

## 2018-10-22 PROCEDURE — 99213 OFFICE O/P EST LOW 20 MIN: CPT | Performed by: SURGERY

## 2018-10-22 NOTE — PROGRESS NOTES
Chief Complaint: Marianne Sawyer is a 38 y.o. female who was seen in consultation at the request of No ref. provider found  for family history breast cancer and a followup visit    History of Present Illness:  Patient presents with family history breast cancer and previous breast biopsy.. She noted no new masses, skin changes, nipple discharge, nipple changes prior to her most recent imaging.  Her most recent imaging includes the following: [facility] bilateral screening mammogram September 20, 2016 fatty replaced breast, BI-RADS 1.    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  Cores returned as fibrocystic change with calcifications.    She has her uterus and ovaries removed for fibroids and bleeding, is postmenopausal, and has taken estradiol for the past 4 years since her 2012 hysterectomy.  Her family history includes the following:     She has one daughter, one son, 2 sisters, one maternal aunt, 4 paternal aunts.  Her maternal grandmother had breast cancer at age 70, her mother had breast cancer in her 50s.  One sister had breast cancer at age 47.  The other sister had breast cancer age 48.  One of her sisters is a patient of ours named Natalia Patricio.      In the interim,  Marianne Sawyer  has done well.  She has noted no changes in her breast exam. No new masses, skin changes, nipple changes, nipple discharge either breast.   She saw genetics and they felt that she did not need testing since her sister had tested negative for mutation.    Her most recent imaging includes the following:  Screening mammogram bilateral [facility] September 29, 2017.  BI-RADS 2.  Scattered fibroglandular densities.  MRI breast bilateral [facility] September 29, 2017 Dr. Hossein David.  Area of enhancement of questionable character with ill-defined marked neurologic features at the  4:00 right breast, 13 cm from the nipple location correlation with diagnostic mammogram and ultrasound.  No suspicious findings in the left breast.  BI-RADS 0.     October 6, 2017 right diagnostic mammogram and right breast ultrasound.  Attention to the 4 o'clock position.  No architectural distortion, mass or area of increased density has developed.  Scattered fiber glandular densities.  Ultrasound in her inferior right breast no cystic or solid mass identified.  At the 3:00, 13 cm from the nipple location a small area of acoustic shadowing noted secondary to normal soft tissue interface.  BI-RADS 2 with return to normal routine mammogram and MRI    She stopped taking estrogen altogether in July 2017.    She has gained 2 #.    Interval History:  In the interim,  Marianne Sawyer  has done well.  She has noted no changes in her breast exam. No new masses, skin changes, nipple changes, nipple discharge either breast.     Her most recent imaging includes the following:  Bilateral screening mammogram dated October 2, 2018.  The parenchyma is fatty-replaced.  No evidence of malignancy in either breast.  BI-RADS 1     Bilateral breast MRI October 2, 2018 [facility].  No findings suspicious for cancer in either breast BI-RADS 1.      She has seen Dr Park from [place] and she was not pleased with that visit.    She is here to review.      Review of Systems:  Review of Systems   Psychiatric/Behavioral: Positive for depression.   All other systems reviewed and are negative.       Past Medical and Surgical History:  Breast Biopsy History:  Patient has had the following breast biopsies:2/2013 A FIBROCYSTIC DISEASE WITH MICROCALCIFICATIONS.   Breast Cancer HIstory:  Patient does not have a past medical history of breast cancer.  Breast Operations, and year:  None   Obstetric/Gynecologic History:  Age menstrual periods began: 11 yrs old   Patient is postmenopausal due to removal of her uterus and both ovaries in the  following year: 3/2012   Number of pregnancies: 2  Number of live births: 2  Number of abortions or miscarriages: 0  Age of delivery of first child: 23 yrs old   Patient did not breast feed.  Length of time taking birth control pills: 19 yrs   Patient took hormone replacement during the following dates: Estradiol 4 yrs  Patient had uterus and ovaries removed     Past Surgical History:   Procedure Laterality Date   • BREAST BIOPSY      benign    •  SECTION     • DILATATION AND CURETTAGE     • LASER ABLATION      eye-bilateral   • LASIK     • SKIN LESION EXCISION      benign-back   • TOTAL ABDOMINAL HYSTERECTOMY WITH SALPINGO OOPHORECTOMY         Past Medical History:   Diagnosis Date   • Depression    • Factor 5 Leiden mutation, heterozygous (CMS/HCC)    • Mastodynia    • Mood disorder (CMS/HCC)        Prior Hospitalizations, other than for surgery or childbirth, and year:  None     Social History     Social History   • Marital status:      Spouse name: N/A   • Number of children: N/A   • Years of education: N/A     Occupational History   • Not on file.     Social History Main Topics   • Smoking status: Never Smoker   • Smokeless tobacco: Not on file   • Alcohol use Yes      Comment: beer occassionally   • Drug use: No   • Sexual activity: Not on file     Other Topics Concern   • Not on file     Social History Narrative   • No narrative on file     Patient is .  Patient is employed full time with the following occupation: RN  Patient drinks 1 servings of caffeine per day.    Family History:  Family History   Problem Relation Age of Onset   • Asthma Mother    • COPD Mother    • Deep vein thrombosis Mother    • Depression Mother    • Breast cancer Mother         50s   • Clotting disorder Father    • Deep vein thrombosis Father    • Hypertension Father    • Hyperlipidemia Father    • Clotting disorder Sister    • Depression Sister    • Breast cancer Sister 47   • Breast cancer Paternal Aunt  "        60-70s   • Breast cancer Maternal Grandmother         70s   • Esophageal cancer Maternal Grandfather         90s   • Lung cancer Paternal Grandmother    • Breast cancer Sister 48       Vital Signs:  /78   Pulse 99   Ht 170.2 cm (67\")   Wt 118 kg (260 lb)   SpO2 97%   BMI 40.72 kg/m²      Medications:    Current Outpatient Prescriptions:   •  FLUoxetine (PROzac) 10 MG capsule, TK 1 C PO ONCE D, Disp: , Rfl: 3     Allergies:  Allergies   Allergen Reactions   • Cleocin [Clindamycin Hcl] Anaphylaxis   • Sulfa Antibiotics Rash       Physical Examination:  /78   Pulse 99   Ht 170.2 cm (67\")   Wt 118 kg (260 lb)   SpO2 97%   BMI 40.72 kg/m²   General Appearance:  Patient is in no distress.  She is well kept and has an morbidly obese build.   Psychiatric:  Patient with appropriate mood and affect. Alert and oriented to self, time, and place.    Breast, RIGHT:  large sized, symmetric with the contralateral side.  Breast skin is without erythema, edema, rashes.  There are no visible abnormalities upon inspection during the arm-raising maneuver or with hands on hips in the sitting position. There is no nipple retraction, discharge or nipple/areolar skin changes.  There is a palpable subcentimeter superficial smoothly marginated mass at the right breast 11:00, 4 cm from the nipple location.  No overlying skin changes.  There are no other masses palpable in the sitting or supine positions.    Breast, LEFT:  large sized, symmetric with the contralateral side.  Breast skin is without erythema, edema, rashes.  There are no visible abnormalities upon inspection during the arm-raising maneuver or with hands on hips in the sitting position. There is no nipple retraction, discharge or nipple/areolar skin changes.There are no masses palpable in the sitting or supine positions.    Lymphatic:  There is no axillary, cervical, infraclavicular, or supraclavicular adenopathy bilaterally.  Eyes:  Pupils are round " and reactive to light.  Cardiovascular:  Heart rate and rhythm are regular.  Respiratory:  Lungs are clear bilaterally with no crackles or wheezes in any lung field.  Gastrointestinal:  Abdomen is soft, nondistended, and nontender.    Musculoskeletal:  Good strength in all 4 extremities.   There is good range of motion in both shoulders.    Skin:  No new skin lesions or rashes on the skin excluding the breast (see breast exam above).        Imagin13  [facility], [place]  SCREENING BILATERAL MAMMOGRAM  GERALD, CATRACHITO R  Scattered fibrogladular densities.   Finding 1: round mass upper inner quadrant right breast.   Finding 2: pleomorphic calcifications upper outer quadrant of the left breast.   IMPRESSION:   Finding 1: requires additional evaluation.   Finding 2: require additional evaluation.   BIRADS Category 0    13 [facility]  BILATERAL DIGITAL SPOTS-BILATERAL  GERALD, CATRACHITO R  Scattered fibrogladular densities.   Finding 1: additional evaluation round mass upper inner region right breast does persist. This could possibly represent an intramammary lymph node.   Finding 2: calcifications in the upper outer region of the left breast. Calcifiactuib have a suspicious appearance for malignancy and biopsy is recommended.   ULTRASOUND FINDINGS:   Finding 1: no discrete solid or cystic mass or area of abnormal shadowing. Probably benign. Recommend a right diagnostic mammogram in 6 months.   Finding 2: suspicious  BI-RADS Category 4    13  [facility]  MIKIE MAMMOGRAM DIAG GERALD, CATRACHITO   Scattered fibrogladular densities.   BI-RADS 2=Benign Findings     14  [facility]  UNI MARLEEN MAMMO GERALD, CATRACHITO  Scattered fibrogladular densities. BI-RADS 1-Negative     09/15/14  [facility]  MIKIE SCREENING MAMMO GERALD, CATRACHITO  PREDOMINANTLY FATTY BI-RADS 2    09/21/15  [facility]   SCREEN MAMMO   GERALD, CATRACHITO   BREAST  COMPSITION: 1, predominately fatty    09/28/16  [facility]  MG MIKIE SCR MAMMO  CATRACHITO DUARTE   BREAST COMPOSITION: 1 PREDOMINATLEY FATTY    Bilateral screening mammogram dated October 2, 2018.  The parenchyma is fatty-replaced.  No evidence of malignancy in either breast.  BI-RADS 1     Bilateral breast MRI October 2, 2018 [facility].  No findings suspicious for cancer in either breast BI-RADS 1.      Pathology:  02/25/13 [facility] KAY MORSE  STEREO MAMMOTOME LT   CATRACHITO DUARTE  An 11-gauge mammotome. Ultra-gel trenton system. Post-clip stereo views and mammogram revealed the clip to be in adequate position. Six month mammogram recommended for follow up.     03/04/13  [facility]   SURGICAL PATHOLOGY  CATRACHITO DUARTE.   FINAL PATHOLOGIC DIAGNOSIS  A FIBROCYSTIC DISEASE WITH MICROCALCIFICATIONS.     Procedures:      Assessment:   Diagnosis Plan   1. Lump or mass in breast  US Breast Right Limited   2. FH: breast cancer  Ambulatory Referral to Hematology / Oncology    MRI Breast Bilateral With & Without Contrast    Mammo Screening Digital Tomosynthesis Bilateral With CAD   3. Fibrocystic breast disease (FCBD), unspecified laterality     4. Factor V Leiden carrier (CMS/HCC)          1-  RIGHT 11:00, 4 CFN      2-  Maternal grandmother age 70, mother age 55, sister #1 age 47, Sister #2 age 48.  Sister Natalia Curry, [id number]- is a patient of ours who had [facility] genetic testing that was a breast-ovarian cancer panel and negative for mutation   - patient saw genetics per Dr Main, and did not have testing since her family member had.  -stopped estradiol 7-2017    3-  Left breast calcifications March 2013, fibrocystic change with calcifications on stereotactic biopsy.    4-  Factor V heterozygote, inherited from her father.  Father had deep venous thrombosis.  Patient has not had a DVT or pulmonary embolus.  She did take heparin with a pregnancy.  She did not take  heparin or Lovenox perioperatively surrounding her hysterectomy.      Plan:  We reviewed her interval history, imaging, and imaging reports together today.     Her imaging and imaging reports are in good order today.  I do feel a new small superficial mass right breast that I suspect is benign, but has sent her over for a targeted ultrasound today.  We discussed her visit with medical oncology.  His note states that tamoxifen would not be an ideal agent due to her postmenopausal status and history of DVT and factor V Leiden carrier.  I agree with this.  He mentioned that she should lose some weight prior to starting Arimidex.  She does request to see a different medical oncologist to discuss risk reduction.  We will arrange that for her.  I will plan to see her back in 1 year October 3, 2019 after her mammogram and MRI at [facility].  This is assuming that her right breast ultrasound here today is in good order.  We will call her with that result.  I've asked her to continue her self breast exam and call us in the interim with any concerns or changes and we'd be happy to see her back sooner.       Previously,I calculated her lifetime risk of breast cancer using the mastery of breast surgery  model as: 30-56%  Her 5 year risk for developing breast cancer is 2.8-8.7%    With a lifetime risk of breast cancer greater than 20%, the current recommendations for screening include annual mammography and annual MRI, with clinical examination.   We are currently in surveillance.    We also discussed that for a Josselin model 5 year risk greater than 1.7% or LCIS, and a life expectancy > 10 years, that we recommend risk reduction counseling with the medical oncologists about chemopreventive agents such as tamoxifen, raloxifene, or exemestane. We are getting her to see a second opinion medical oncology, as she had a bad experience with Dr. Park.      Son, her sister, who is a patient of ours, sees Dr. Main from medical  oncology, and he and his office are working on getting her into a clinical trial at [facility] based on her family history and lack of genetic mutation.        Rocío Bass MD    -- addendum- ultrasound at [facility] given a BR4, due to palpable finding that is not visible on MRI- ? Evolving fat necrosis. Discussed with Dr David. Will arrange and see her back after.    Final report October 22, 2018 right breast ultrasound [facility]: At the 10:00, 3 cm from the nipple location there is a 5 mm mass heterogenous with surrounding subtle echogenic halo.  Correlation with ultrasound guided right breast biopsy recommended BI-RADS 4.  We will arrange this and see her back after.      We have spent 15 minutes in visit today, 10 in face to face .      Next Appointment:  Return for Next scheduled follow up, after imaging.      EMR Dragon/transcription disclaimer:    Much of this encounter note is an electronic transcription/translocation of spoken language to printed text.  The electronic translation of spoken language may permit erroneous, or at times, nonsensical words or phrases to be inadvertently transcribed.  Although I have reviewed the note from such areas, some may still exist.

## 2018-11-02 ENCOUNTER — HOSPITAL ENCOUNTER (OUTPATIENT)
Dept: ULTRASOUND IMAGING | Facility: HOSPITAL | Age: 38
Discharge: HOME | End: 2018-11-02
Attending: SURGERY | Admitting: SURGERY

## 2018-11-02 VITALS
SYSTOLIC BLOOD PRESSURE: 140 MMHG | HEIGHT: 67 IN | RESPIRATION RATE: 18 BRPM | WEIGHT: 258 LBS | OXYGEN SATURATION: 99 % | DIASTOLIC BLOOD PRESSURE: 90 MMHG | BODY MASS INDEX: 40.49 KG/M2 | TEMPERATURE: 97.1 F | HEART RATE: 74 BPM

## 2018-11-02 DIAGNOSIS — N64.4 PAIN OF RIGHT BREAST: ICD-10-CM

## 2018-11-02 PROCEDURE — 88305 TISSUE EXAM BY PATHOLOGIST: CPT | Performed by: SURGERY

## 2018-11-02 PROCEDURE — 88342 IMHCHEM/IMCYTCHM 1ST ANTB: CPT | Performed by: SURGERY

## 2018-11-02 RX ORDER — LIDOCAINE HYDROCHLORIDE AND EPINEPHRINE 10; 10 MG/ML; UG/ML
20 INJECTION, SOLUTION INFILTRATION; PERINEURAL ONCE
Status: COMPLETED | OUTPATIENT
Start: 2018-11-02 | End: 2018-11-02

## 2018-11-02 RX ORDER — DIAZEPAM 5 MG/1
5 TABLET ORAL ONCE AS NEEDED
Status: DISCONTINUED | OUTPATIENT
Start: 2018-11-02 | End: 2018-11-03 | Stop reason: HOSPADM

## 2018-11-02 RX ORDER — LIDOCAINE HYDROCHLORIDE 10 MG/ML
10 INJECTION, SOLUTION INFILTRATION; PERINEURAL ONCE
Status: COMPLETED | OUTPATIENT
Start: 2018-11-02 | End: 2018-11-02

## 2018-11-02 RX ADMIN — LIDOCAINE HYDROCHLORIDE AND EPINEPHRINE 2 ML: 10; 10 INJECTION, SOLUTION INFILTRATION; PERINEURAL at 10:56

## 2018-11-02 RX ADMIN — LIDOCAINE HYDROCHLORIDE 10 ML: 10 INJECTION, SOLUTION INFILTRATION; PERINEURAL at 10:53

## 2018-11-02 NOTE — PRE-PROCEDURE NOTE
39 yo F with a right breast mass    Vitals stable    Right breast 10:00 lesion    Ultrasound guided right breast biopsy with clip placement

## 2018-11-02 NOTE — NURSING NOTE
Biopsy site to right outer breast clear with Skin Affix dry and intact. No firmness or swelling noted at or around biopsy site. Denies pain. Ice pack with protective covering applied to biopsy site. Discharge instructions discussed with understanding voiced by patient. Copies provided to patient. No distress noted. To home via private vehicle.

## 2018-11-02 NOTE — NURSING NOTE
Call from  to inform that patient has arrived for procedure. Please advise the patient she is a little early so I will be out to get her once I finish with current patient.

## 2018-11-03 ENCOUNTER — TELEPHONE (OUTPATIENT)
Dept: INTERVENTIONAL RADIOLOGY/VASCULAR | Facility: HOSPITAL | Age: 38
End: 2018-11-03

## 2018-11-06 ENCOUNTER — TELEPHONE (OUTPATIENT)
Dept: SURGERY | Facility: CLINIC | Age: 38
End: 2018-11-06

## 2018-11-06 NOTE — TELEPHONE ENCOUNTER
November 5, 2018 right breast ultrasound-guided biopsy 10:00, 3 cm from the nipple.  11-gauge mammotome, multiple tissue specimens.  Pathology result returned as fat necrosis which is concordant.  We will let her know.    Final Diagnosis   1. Breast, Right 10 o'clock position 3 CMFN, Core Biopsy:               A. Fat necrosis.          Dr Hopkins report pending.

## 2018-11-08 ENCOUNTER — OFFICE VISIT (OUTPATIENT)
Dept: SURGERY | Facility: CLINIC | Age: 38
End: 2018-11-08

## 2018-11-08 VITALS
WEIGHT: 262 LBS | HEART RATE: 75 BPM | DIASTOLIC BLOOD PRESSURE: 74 MMHG | BODY MASS INDEX: 41.12 KG/M2 | HEIGHT: 67 IN | OXYGEN SATURATION: 98 % | SYSTOLIC BLOOD PRESSURE: 118 MMHG

## 2018-11-08 DIAGNOSIS — D68.51 FACTOR V LEIDEN (HCC): ICD-10-CM

## 2018-11-08 DIAGNOSIS — N64.1 FAT NECROSIS OF BREAST: Primary | ICD-10-CM

## 2018-11-08 DIAGNOSIS — N63.0 LUMP OR MASS IN BREAST: ICD-10-CM

## 2018-11-08 DIAGNOSIS — N60.19 FIBROCYSTIC BREAST DISEASE (FCBD), UNSPECIFIED LATERALITY: ICD-10-CM

## 2018-11-08 DIAGNOSIS — Z80.3 FH: BREAST CANCER: ICD-10-CM

## 2018-11-08 LAB
CYTO UR: NORMAL
LAB AP CASE REPORT: NORMAL
LAB AP CLINICAL INFORMATION: NORMAL
PATH REPORT.ADDENDUM SPEC: NORMAL
PATH REPORT.FINAL DX SPEC: NORMAL
PATH REPORT.GROSS SPEC: NORMAL

## 2018-11-08 PROCEDURE — 99213 OFFICE O/P EST LOW 20 MIN: CPT | Performed by: SURGERY

## 2018-11-08 NOTE — PROGRESS NOTES
Chief Complaint: Marianne Sawyer is a 38 y.o. female who was seen in consultation at the request of No ref. provider found  for family history breast cancer, a followup visit and a post-biopsy visit    History of Present Illness:  Patient presents with family history breast cancer and previous breast biopsy.. She noted no new masses, skin changes, nipple discharge, nipple changes prior to her most recent imaging.  Her most recent imaging includes the following: [facility] bilateral screening mammogram September 20, 2016 fatty replaced breast, BI-RADS 1.    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  Cores returned as fibrocystic change with calcifications.    She has her uterus and ovaries removed for fibroids and bleeding, is postmenopausal, and has taken estradiol for the past 4 years since her 2012 hysterectomy.  Her family history includes the following:     She has one daughter, one son, 2 sisters, one maternal aunt, 4 paternal aunts.  Her maternal grandmother had breast cancer at age 70, her mother had breast cancer in her 50s.  One sister had breast cancer at age 47.  The other sister had breast cancer age 48.  One of her sisters is a patient of ours named Natalia Patricio.      In the interim,  Marianne Sawyer  has done well.  She has noted no changes in her breast exam. No new masses, skin changes, nipple changes, nipple discharge either breast.   She saw genetics and they felt that she did not need testing since her sister had tested negative for mutation.    Her most recent imaging includes the following:  Screening mammogram bilateral [facility] September 29, 2017.  BI-RADS 2.  Scattered fibroglandular densities.  MRI breast bilateral [facility] September 29, 2017 Dr. Hossein David.  Area of enhancement of questionable character with ill-defined marked  neurologic features at the 4:00 right breast, 13 cm from the nipple location correlation with diagnostic mammogram and ultrasound.  No suspicious findings in the left breast.  BI-RADS 0.     October 6, 2017 right diagnostic mammogram and right breast ultrasound.  Attention to the 4 o'clock position.  No architectural distortion, mass or area of increased density has developed.  Scattered fiber glandular densities.  Ultrasound in her inferior right breast no cystic or solid mass identified.  At the 3:00, 13 cm from the nipple location a small area of acoustic shadowing noted secondary to normal soft tissue interface.  BI-RADS 2 with return to normal routine mammogram and MRI    She stopped taking estrogen altogether in July 2017.    She has gained 2 #.      In the interim,  Marianne Sawyer  has done well.  She has noted no changes in her breast exam. No new masses, skin changes, nipple changes, nipple discharge either breast.     Her most recent imaging includes the following:  Bilateral screening mammogram dated October 2, 2018.  The parenchyma is fatty-replaced.  No evidence of malignancy in either breast.  BI-RADS 1     Bilateral breast MRI October 2, 2018 [facility].  No findings suspicious for cancer in either breast BI-RADS 1.      She has seen Dr Park from [facility] and she was not pleased with that visit.    Interval History:  October 22, 2018 right breast ultrasound [facility]: At the 10:00, 3 cm from the nipple location there is a 5 mm mass heterogenous with surrounding subtle echogenic halo.  Correlation with ultrasound guided right breast biopsy recommended BI-RADS 4.   November 5, 2018 right breast ultrasound-guided biopsy 10:00, 3 cm from the nipple.  11-gauge mammotome, multiple tissue specimens.  Pathology result returned as fat necrosis which is concordant.    She reports some bruising.      Review of Systems:  Review of Systems   Psychiatric/Behavioral: Positive for  depression.   All other systems reviewed and are negative.       Past Medical and Surgical History:  Breast Biopsy History:  Patient has had the following breast biopsies:2013 A FIBROCYSTIC DISEASE WITH MICROCALCIFICATIONS.   Breast Cancer HIstory:  Patient does not have a past medical history of breast cancer.  Breast Operations, and year:  None   Obstetric/Gynecologic History:  Age menstrual periods began: 11 yrs old   Patient is postmenopausal due to removal of her uterus and both ovaries in the following year: 3/2012   Number of pregnancies: 2  Number of live births: 2  Number of abortions or miscarriages: 0  Age of delivery of first child: 23 yrs old   Patient did not breast feed.  Length of time taking birth control pills: 19 yrs   Patient took hormone replacement during the following dates: Estradiol 4 yrs  Patient had uterus and ovaries removed     Past Surgical History:   Procedure Laterality Date   • BREAST BIOPSY Left     benign    •  SECTION     • DILATATION AND CURETTAGE     • LASER ABLATION      eye-bilateral   • LASIK     • SKIN LESION EXCISION      benign-back   • TOTAL ABDOMINAL HYSTERECTOMY WITH SALPINGO OOPHORECTOMY         Past Medical History:   Diagnosis Date   • Depression    • Factor 5 Leiden mutation, heterozygous (CMS/HCC)    • Mastodynia    • Mood disorder (CMS/HCC)        Prior Hospitalizations, other than for surgery or childbirth, and year:  None     Social History     Social History   • Marital status:      Spouse name: N/A   • Number of children: N/A   • Years of education: N/A     Occupational History   • Not on file.     Social History Main Topics   • Smoking status: Never Smoker   • Smokeless tobacco: Not on file   • Alcohol use Yes      Comment: beer occassionally   • Drug use: No   • Sexual activity: Not on file     Other Topics Concern   • Not on file     Social History Narrative   • No narrative on file     Patient is .  Patient is employed full  "time with the following occupation: RN  Patient drinks 1 servings of caffeine per day.    Family History:  Family History   Problem Relation Age of Onset   • Asthma Mother    • COPD Mother    • Deep vein thrombosis Mother    • Depression Mother    • Breast cancer Mother         50s   • Clotting disorder Father    • Deep vein thrombosis Father    • Hypertension Father    • Hyperlipidemia Father    • Clotting disorder Sister    • Depression Sister    • Breast cancer Sister 47   • Breast cancer Paternal Aunt         60-70s   • Breast cancer Maternal Grandmother         70s   • Esophageal cancer Maternal Grandfather         90s   • Lung cancer Paternal Grandmother    • Breast cancer Sister 48       Vital Signs:  /74 (BP Location: Left arm, Patient Position: Sitting, Cuff Size: Large Adult)   Pulse 75   Ht 170.2 cm (67\")   Wt 119 kg (262 lb)   SpO2 98%   BMI 41.04 kg/m²      Medications:  No current outpatient prescriptions on file.     Allergies:  Allergies   Allergen Reactions   • Cleocin [Clindamycin Hcl] Anaphylaxis   • Sulfa Antibiotics Rash       Physical Examination:  /74 (BP Location: Left arm, Patient Position: Sitting, Cuff Size: Large Adult)   Pulse 75   Ht 170.2 cm (67\")   Wt 119 kg (262 lb)   SpO2 98%   BMI 41.04 kg/m²   General Appearance:  Patient is in no distress.  She is well kept and has an morbidly obese build.   Psychiatric:  Patient with appropriate mood and affect. Alert and oriented to self, time, and place.    Breast, RIGHT:  large sized, symmetric with the contralateral side.  Breast skin is without erythema, edema, rashes.  There are no visible abnormalities upon inspection during the arm-raising maneuver or with hands on hips in the sitting position. There is no nipple retraction, discharge or nipple/areolar skin changes.  There is an area of ecchymoses right breast at the site of mammotomy where she had a recent biopsy.  Mammotomy is well-healing.  Ecchymoses are mild.  " There is no longer a palpable mass at this location but this may be masked by her biopsy site changes.    There are no masses palpable in the sitting or supine positions.    Breast, LEFT:  large sized, symmetric with the contralateral side.  Breast skin is without erythema, edema, rashes.  There are no visible abnormalities upon inspection during the arm-raising maneuver or with hands on hips in the sitting position. There is no nipple retraction, discharge or nipple/areolar skin changes.There are no masses palpable in the sitting or supine positions.    Lymphatic:  There is no axillary, cervical, infraclavicular, or supraclavicular adenopathy bilaterally.  Eyes:  Pupils are round and reactive to light.  Cardiovascular:  Heart rate and rhythm are regular.  Respiratory:  Lungs are clear bilaterally with no crackles or wheezes in any lung field.  Gastrointestinal:  Abdomen is soft, nondistended, and nontender.    Musculoskeletal:  Good strength in all 4 extremities.   There is good range of motion in both shoulders.    Skin:  No new skin lesions or rashes on the skin excluding the breast (see breast exam above).        Imagin13  [facility] [place]  SCREENING BILATERAL MAMMOGRAM  GERALD, CATRACHITO R  Scattered fibrogladular densities.   Finding 1: round mass upper inner quadrant right breast.   Finding 2: pleomorphic calcifications upper outer quadrant of the left breast.   IMPRESSION:   Finding 1: requires additional evaluation.   Finding 2: require additional evaluation.   BIRADS Category 0    13 [facility] [place]  BILATERAL DIGITAL SPOTS-BILATERAL  GERALD, CATRACHITO R  Scattered fibrogladular densities.   Finding 1: additional evaluation round mass upper inner region right breast does persist. This could possibly represent an intramammary lymph node.   Finding 2: calcifications in the upper outer region of the left breast. Calcifiactuib have a suspicious appearance for malignancy and  biopsy is recommended.   ULTRASOUND FINDINGS:   Finding 1: no discrete solid or cystic mass or area of abnormal shadowing. Probably benign. Recommend a right diagnostic mammogram in 6 months.   Finding 2: suspicious  BI-RADS Category 4    09/09/13  [facility]  MIKIE MAMMOGRAM DIAG GERALD, CATRACHITO   Scattered fibrogladular densities.   BI-RADS 2=Benign Findings     01/13/14  [facility]  UNI MARLEEN MAMMO GERALD, CATRACHITO  Scattered fibrogladular densities. BI-RADS 1-Negative     09/15/14  [facility]  MIKIE SCREENING MAMMO GERALD, CATRACHITO  PREDOMINANTLY FATTY BI-RADS 2    09/21/15  [facility]   SCREEN MAMMO   GERALD, CATRACHITO   BREAST COMPSITION: 1, predominately fatty    09/28/16  [facility] MIKIE SCR MAMMO  GERALD, CATRACHITO   BREAST COMPOSITION: 1 PREDOMINATLEY FATTY    Bilateral screening mammogram dated October 2, 2018.  The parenchyma is fatty-replaced.  No evidence of malignancy in either breast.  BI-RADS 1     Bilateral breast MRI October 2, 2018 [facility].  No findings suspicious for cancer in either breast BI-RADS 1.      Pathology:  02/25/13 [facility] KAY MORSE  STEREO MAMMOTOME LT   CATRACHITO DUARTE  An 11-gauge mammotome. Ultra-gel trenton system. Post-clip stereo views and mammogram revealed the clip to be in adequate position. Six month mammogram recommended for follow up.     03/04/13  [facility]   SURGICAL PATHOLOGY  CATRACHITO DUARTE.   FINAL PATHOLOGIC DIAGNOSIS  A FIBROCYSTIC DISEASE WITH MICROCALCIFICATIONS.     November 5, 2018 right breast ultrasound-guided biopsy 10:00, 3 cm from the nipple.  11-gauge mammotome, multiple tissue specimens.  Pathology result returned as fat necrosis which is concordant.  We will let her know.     Final Diagnosis   1. Breast, Right 10 o'clock position 3 CMFN, Core Biopsy:               A. Fat necrosis.            Dr Hopkins verbal report of consistent with fat  necrosis.          Procedures:      Assessment:   Diagnosis Plan   1. Fat necrosis of breast     2. Lump or mass in breast     3. Fibrocystic breast disease (FCBD), unspecified laterality     4. FH: breast cancer     5. Factor V Leiden (CMS/[facility])          1-2  RIGHT 11:00, 4 CFN  percutaneous biopsy of papable finding that resembled fat necrosis on ultrasound- returned as fat necrosis.    4-  Maternal grandmother age 70, mother age 55, sister #1 age 47, Sister #2 age 48.  Sister Natalia Curry, 1-8-68- is a patient of ours who had [facility] genetic testing that was a breast-ovarian cancer panel and negative for mutation   - patient saw genetics per Dr Main, and did not have testing since her family member had.  -stopped estradiol 7-2017    3-  Left breast calcifications March 2013, fibrocystic change with calcifications on stereotactic biopsy.    5-  Factor V heterozygote, inherited from her father.  Father had deep venous thrombosis.  Patient has not had a DVT or pulmonary embolus.  She did take heparin with a pregnancy.  She did not take heparin or Lovenox perioperatively surrounding her hysterectomy.      Plan:  We reviewed her interval history,exam, pathology reports together today.     She is healing fine from her biopsy.  We discussed the nature of fat necrosis.  She will need no additional dedicated imaging to follow this.  She has an appointment on December 3, 2018 with [facility] group for medical oncology opinion.  This is regarding risk reduction.      Previously,I calculated her lifetime risk of breast cancer using the mastery of breast surgery  model as: 30-56%  Her 5 year risk for developing breast cancer is 2.8-8.7%    With a lifetime risk of breast cancer greater than 20%, the current recommendations for screening include annual mammography and annual MRI, with clinical examination.   We are currently in surveillance.    We also discussed that for a Josselin model 5 year risk greater than 1.7% or  LCIS, and a life expectancy > 10 years, that we recommend risk reduction counseling with the medical oncologists about chemopreventive agents such as tamoxifen, raloxifene, or exemestane.       Son, her sister, who is a patient of ours, sees Dr. Main from medical oncology, and he and his office are working on getting her into a clinical trial at [facility] based on her family history and lack of genetic mutation.        Rocío Bass MD            We have spent 15 minutes in visit today, 9 in face to face .      Next Appointment:  Return for Next scheduled follow up, after imaging.      EMR Dragon/transcription disclaimer:    Much of this encounter note is an electronic transcription/translocation of spoken language to printed text.  The electronic translation of spoken language may permit erroneous, or at times, nonsensical words or phrases to be inadvertently transcribed.  Although I have reviewed the note from such areas, some may still exist.

## 2018-12-03 ENCOUNTER — LAB (OUTPATIENT)
Dept: LAB | Facility: HOSPITAL | Age: 38
End: 2018-12-03

## 2018-12-03 ENCOUNTER — CONSULT (OUTPATIENT)
Dept: ONCOLOGY | Facility: CLINIC | Age: 38
End: 2018-12-03

## 2018-12-03 ENCOUNTER — APPOINTMENT (OUTPATIENT)
Dept: ONCOLOGY | Facility: CLINIC | Age: 38
End: 2018-12-03

## 2018-12-03 VITALS
RESPIRATION RATE: 16 BRPM | BODY MASS INDEX: 41.88 KG/M2 | DIASTOLIC BLOOD PRESSURE: 96 MMHG | SYSTOLIC BLOOD PRESSURE: 155 MMHG | HEIGHT: 66 IN | WEIGHT: 260.6 LBS | HEART RATE: 81 BPM | TEMPERATURE: 98.6 F | OXYGEN SATURATION: 97 %

## 2018-12-03 DIAGNOSIS — D68.51 FACTOR V LEIDEN MUTATION (HCC): Primary | ICD-10-CM

## 2018-12-03 DIAGNOSIS — N63.0 BREAST LUMP: ICD-10-CM

## 2018-12-03 DIAGNOSIS — Z80.3 FAMILY HISTORY OF BREAST CANCER: Primary | ICD-10-CM

## 2018-12-03 LAB
BASOPHILS # BLD AUTO: 0.05 10*3/MM3 (ref 0–0.1)
BASOPHILS NFR BLD AUTO: 0.6 % (ref 0–1.1)
DEPRECATED RDW RBC AUTO: 39.3 FL (ref 37–49)
EOSINOPHIL # BLD AUTO: 0.21 10*3/MM3 (ref 0–0.36)
EOSINOPHIL NFR BLD AUTO: 2.3 % (ref 1–5)
ERYTHROCYTE [DISTWIDTH] IN BLOOD BY AUTOMATED COUNT: 12.2 % (ref 11.7–14.5)
HCT VFR BLD AUTO: 38.7 % (ref 34–45)
HGB BLD-MCNC: 13 G/DL (ref 11.5–14.9)
IMM GRANULOCYTES # BLD: 0.04 10*3/MM3 (ref 0–0.03)
IMM GRANULOCYTES NFR BLD: 0.4 % (ref 0–0.5)
LYMPHOCYTES # BLD AUTO: 2.78 10*3/MM3 (ref 1–3.5)
LYMPHOCYTES NFR BLD AUTO: 30.6 % (ref 20–49)
MCH RBC QN AUTO: 29.6 PG (ref 27–33)
MCHC RBC AUTO-ENTMCNC: 33.6 G/DL (ref 32–35)
MCV RBC AUTO: 88.2 FL (ref 83–97)
MONOCYTES # BLD AUTO: 0.67 10*3/MM3 (ref 0.25–0.8)
MONOCYTES NFR BLD AUTO: 7.4 % (ref 4–12)
NEUTROPHILS # BLD AUTO: 5.34 10*3/MM3 (ref 1.5–7)
NEUTROPHILS NFR BLD AUTO: 58.7 % (ref 39–75)
NRBC BLD MANUAL-RTO: 0 /100 WBC (ref 0–0)
PLATELET # BLD AUTO: 293 10*3/MM3 (ref 150–375)
PMV BLD AUTO: 10.2 FL (ref 8.9–12.1)
RBC # BLD AUTO: 4.39 10*6/MM3 (ref 3.9–5)
WBC NRBC COR # BLD: 9.09 10*3/MM3 (ref 4–10)

## 2018-12-03 PROCEDURE — 85025 COMPLETE CBC W/AUTO DIFF WBC: CPT | Performed by: INTERNAL MEDICINE

## 2018-12-03 PROCEDURE — 80053 COMPREHEN METABOLIC PANEL: CPT | Performed by: INTERNAL MEDICINE

## 2018-12-03 PROCEDURE — 99245 OFF/OP CONSLTJ NEW/EST HI 55: CPT | Performed by: INTERNAL MEDICINE

## 2018-12-03 PROCEDURE — 36415 COLL VENOUS BLD VENIPUNCTURE: CPT | Performed by: INTERNAL MEDICINE

## 2018-12-03 RX ORDER — EXEMESTANE 25 MG/1
25 TABLET ORAL DAILY
Qty: 30 TABLET | Refills: 5 | Status: SHIPPED | OUTPATIENT
Start: 2018-12-03 | End: 2019-01-02

## 2018-12-03 NOTE — PROGRESS NOTES
Subjective     REASON FOR CONSULTATION:  Family history of breast cancer, patient referred for chemoprophylaxis  Provide an opinion on any further workup or treatment                             REQUESTING PHYSICIAN:  Dr. Eri Bass    RECORDS OBTAINED:  Records of the patients history including those obtained from the referring provider were reviewed and summarized in detail.    HISTORY OF PRESENT ILLNESS:  The patient is a 38 y.o. year old female who is here for an opinion about the above issue.    History of Present Illness patient is a 38-year-old female with family history of breast cancer.  She was seen by Dr. Bass initially on June 19, 2017.  She had  biopsy of left breast in February 2013 which showed fibrocystic disease with microcalcifications.  She was 32 years of age at that time.      She also has history of factor V Leiden heterozygosity.    Family history is positive for breast cancer in mother who was in her 50s when she developed breast cancer.  She had a sister develop breast cancer at age 47 and another sister at age 48 developed breast cancer.  Her maternal grandmother had breast cancer at age 70.  Patient's sister had genetic testing done by Dr. Bass and the breast ovarian  cancer panel is negative for mutation.  Patient states that the second sister also were tested and was negative for mutation.     One of the sisters had been followed by Dr. Main.        Patient's 5 year risk of developing breast cancer by the Josselin risk model is 5.1% compared to the gentle palpation of 0.6%.  Also her lifetime risk of recurrence is 45% compared to 12.4% for the general population.  Given her strong family history of breast cancer, 2 previous breast biopsies she has been referred here for evaluation for chemoprophylaxis.    Patient has been followed by yearly mammograms and MRI.    October 2, 2018: Mammogram bilateral digital  FINDINGS: Bilateral digital CC and MLO mammographic images  were  obtained. Comparison is made to prior studies dated 10/6/2017 and  9/29/2017. The parenchyma of both breasts is largely fatty-replaced. I  see no new or dominant masses, areas of architectural distortion or skin  thickening. There is no evidence for axillary lymphadenopathy or nipple  retraction.     IMPRESSION:  1. There is no evidence for malignancy or significant change in either  breast. Routine followup mammography is recommended.    October 2, 2018 bilateral breast MRI with contrast      FINDINGS: There are no areas of abnormal enhancement or morphology in  either breast. A metallic clip representing a site of prior biopsy is  noted within the middle third lateral aspect of the left breast. I see  no evidence for abnormal skin, nipple or chest wall enhancement of  either breast and there is no evidence for axillary adenopathy in either  axilla.     IMPRESSION:  There are no findings suspicious for malignancy in either  breast. Routine follow-up imaging is recommended.    Following screening bilateral digital mammogram and MRI of the breast patient developed a tiny nodule in the upper outer quadrant of the breast closer to the right nipple which could be palpable.  It was a piece size.  As a result patient underwent her sound on the right breast and a biopsy was done under ultrasound guidance.  October 22, 2018:LIMITED UNILATERAL RIGHT BREAST SONOGRAPHY     HISTORY: 38-year-old female with an area of palpable concern in the  right breast presenting for evaluation.     FINDINGS: Targeted sonographic evaluation of the right breast was  performed through the area of palpable concern which corresponds to the  10-o'clock position on the order of 3 cm from the nipple. Comparison is  made to the patient's breast MRI and mammogram dated 10/02/2018. At the  site of palpable concern which corresponds to the 10-o'clock position on  the order of 3 cm from the nipple there is a heterogenous 0.5 cm mass  with a  surrounding subtle echogenic halo. No detectable internal  vascularity is appreciated.     IMPRESSION:  Ill-defined 0.5 cm heterogenous mass at the 10-o'clock position in the  right breast on the order of 3 cm from the nipple corresponding to the  area of palpable concern. Correlation with ultrasound-guided right  breast biopsy is recommended.    Right breast biopsy at 10 o'clock position, path is consistent with fat necrosis.    Patient had genetic counseling back in 2017.  Given that the sisters testing was negative, they felt that there was no need to test the patient.  However she still at high risk given the strong family history of breast cancer.  By Tyrer Cusik model her risk lifetime is greater than 20%.  As a result screening mammogram and breast MRI are done yearly on this patient.    Patient has been on birth control pills for at least 20 years and subsequently she underwent hysterectomy and self bilateral something oophorectomy at age 32 because of menorrhagia and dysmenorrhea.  Since then she is been on 5 years of study all which she has discontinued.    Patient was referred to Dr. Main for chemoprophylaxis evaluation was seen by Dr.Amir Park , Dr. Main,s  Partner .   She was told that she will benefit and have a better response after BMI reduction.  He was planning on annual evaluation.      Past Medical History:   Diagnosis Date   • Depression    • Factor 5 Leiden mutation, heterozygous (CMS/HCC)    • Lump of breast, right    • Mastodynia    • Mood disorder (CMS/HCC)         Past Surgical History:   Procedure Laterality Date   • BREAST BIOPSY Left     benign    •  SECTION     • DILATATION AND CURETTAGE     • LASER ABLATION      eye-bilateral   • LASIK     • SKIN LESION EXCISION      benign-back   • TOTAL ABDOMINAL HYSTERECTOMY WITH SALPINGO OOPHORECTOMY  2012    fibroids and bleeding        No current outpatient medications on file prior to visit.     No current  facility-administered medications on file prior to visit.         ALLERGIES:    Allergies   Allergen Reactions   • Cleocin [Clindamycin Hcl] Anaphylaxis   • Ceclor [Cefaclor] Unknown (See Comments)     unknown   • Sulfa Antibiotics Rash      OB/GYN history: Menstrual periods started at age 11.  She had menopause at age 32 as she underwent hysterectomy with bilateral salpingo-oophorectomy because of menorrhagia and dysmenorrhea.  She was exposed to birth control pills for 20 years while growing up and following her hysterectomy and bilateral oophorectomy she was on a study of 45 years.  She is  2 para 2 and her first child was born at age 23 and second child at age 27.  She did not breast-feed them.    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  She does not smoke and rarely drinks.  She does have a tattoo.  She is  and is with her .  She is 2 children her daughter is 15 years of age and a son who is 8 years of age.    Family history: Father is alive at 76 with heart disease, DVT and history of factor V Leiden heterozygosity.  Mother is 73 with as tomorrow hypertension and pulmonary embolism and when she was older.  She has one sister was 48 years old is has factor V Leiden heterozygosity.  And another sister was 47 and is in good health.    Family history is positive for breast cancer in mother at age 50, maternal grandmother at age 70, 1 sister at age 48 years and another sister at age 47 years.  She also states that her mother's first cousins had breast cancer at a younger age.     Family History   Problem Relation Age of Onset   • Asthma Mother    • COPD Mother    • Deep vein thrombosis Mother    • Depression Mother    • Breast cancer Mother         50s   • Clotting disorder Father    • Deep vein thrombosis Father    • Hypertension Father    • Hyperlipidemia Father    • Clotting disorder Sister    • Depression  "Sister    • Breast cancer Sister 47   • Breast cancer Paternal Aunt         60-70s   • Breast cancer Maternal Grandmother         70s   • Esophageal cancer Maternal Grandfather         90s   • Lung cancer Paternal Grandmother    • Breast cancer Sister 48        Review of Systems   Constitutional: Negative for appetite change, chills, diaphoresis, fatigue, fever and unexpected weight change.   HENT: Negative for hearing loss, sore throat and trouble swallowing.    Respiratory: Negative for cough, chest tightness, shortness of breath and wheezing.    Cardiovascular: Negative for chest pain, palpitations and leg swelling.   Gastrointestinal: Negative for abdominal distention, abdominal pain, constipation, diarrhea, nausea and vomiting.   Genitourinary: Negative for dysuria, frequency, hematuria and urgency.   Musculoskeletal: Negative for joint swelling.        No muscle weakness.   Skin: Negative for rash and wound.   Neurological: Negative for seizures, syncope, speech difficulty, weakness, numbness and headaches.   Hematological: Negative for adenopathy. Does not bruise/bleed easily.   Psychiatric/Behavioral: Negative for behavioral problems, confusion and suicidal ideas.        Objective     Vitals:    12/03/18 1615   BP: 155/96   Pulse: 81   Resp: 16   Temp: 98.6 °F (37 °C)   TempSrc: Oral   SpO2: 97%   Weight: 118 kg (260 lb 9.6 oz)   Height: 167.6 cm (66\")  Comment: new    PainSc: 0-No pain     Current Status 12/3/2018   ECOG score 0       Physical Exam      GENERAL:  Well-developed, well-nourished in no acute distress.   SKIN:  Warm, dry without rashes, purpura or petechiae.  EYES:  Pupils equal, round and reactive to light.  EOMs intact.  Conjunctivae normal.  EARS:  Hearing intact.  NOSE:  Septum midline.  No excoriations or nasal discharge.  MOUTH:  Tongue is well-papillated; no stomatitis or ulcers.  Lips normal.  THROAT:  Oropharynx without lesions or exudates.  NECK:  Supple with good range of motion; " no thyromegaly or masses, no JVD.  LYMPHATICS:  No cervical, supraclavicular, axillary or inguinal adenopathy.  CHEST:  Lungs clear to auscultation. Good airflow  BREASTS: Right breast: Patient has a tiny nodule which is Pea  size at 10 o'clock position in the upper outer quadrant superolateral to the nipple.  There is no skin changes or erythema or warmth on the breast, no other masses felt.  There is no axillary adenopathy or supraclavicular adenopathy on the right side.    Left breast: I do not appreciate any masses, no nipple discharge, no nipple retraction, no evidence of any skin changes or erythema or warmth on the left breast is no left axillary or left supraclavicular adenopathy..  CARDIAC:  Regular rate and rhythm without murmurs, rubs or gallops. Normal S1,S2.  ABDOMEN:  Soft, nontender with no hepatosplenomegaly or masses.  EXTREMITIES:  No clubbing, cyanosis or edema.  NEUROLOGICAL:  Cranial Nerves II-XII grossly intact.  No focal neurological deficits.  PSYCHIATRIC:  Normal affect and mood.        RECENT LABS:  Hematology WBC   Date Value Ref Range Status   12/03/2018 9.09 4.00 - 10.00 10*3/mm3 Final     RBC   Date Value Ref Range Status   12/03/2018 4.39 3.90 - 5.00 10*6/mm3 Final     Hemoglobin   Date Value Ref Range Status   12/03/2018 13.0 11.5 - 14.9 g/dL Final     Hematocrit   Date Value Ref Range Status   12/03/2018 38.7 34.0 - 45.0 % Final     Platelets   Date Value Ref Range Status   12/03/2018 293 150 - 375 10*3/mm3 Final          Assessment/Plan   1.  Strong family history of breast cancer: Patient's mother, grandmother, 2 sisters with breast cancer.  Patient has had 2 biopsies one in 2013 which was negative and a second one in November 2018 which turned out to be fat necrosis.  Patient has been followed by Dr. Bass since 2017 .  Patients 2 sisters had genetic evaluation and apparently negative.  Patient was sent for genetic counseling and since the cystoscopy that was tested was  negative, genetics did not feel the need to do any further genetics testing.    Dr. Bass had initially referred the patient to Dr. Main's group as patient's sister is being seen by Dr. Main as well for breast cancer.  Patient was seen by Dr. Gladis Park and his suggestion was to exercise, diet and decrease BMI prior to consideration of any chemoprophylaxis.    I had a lengthy discussion about chemoprophylaxis in this patient with tamoxifen, Aromasin and raloxifene.  Given that patient has factor V Leiden heterozygosity I would be concerned about starting tamoxifen as the risk for hypercoagulable state DVT, pulmonary embolism and strokes are higher with tamoxifen.  I then explained the side effects of all of the 3 drugs.  Certainly exercise, focusing on the diet and healthy lifestyle is important.  I do think that it's reasonable to consider aromatase inhibitor exemestane in this setting as a chance for blood clots is less than 2%.  Patient understands that.    Patient's 5 year risk of developing breast cancer by the Josselin risk model is 5.1% compared to the gentle palpation of 0.6%.  Also her lifetime risk of recurrence is 45% compared to 12.4% for the general population.  Given her strong family history of breast cancer, 2 previous breast biopsies she has been referred here for evaluation for chemoprophylaxis.      2.  Factor V Leiden heterozygosity: Patient also has history of factor V Leiden heterozygosity.  She does not have any personal history of DVT or pulmonary embolism but had a father who had deep vein thrombosis with factor V Leiden mutation.    3.  History of HELLP syndrome during her first pregnancy and preeclampsia with second pregnancy.  She has had 2 C-sections.      4.  Is post hysterectomy with bilateral salpingo-oophorectomy for menorrhagia and dysmenorrhea at age 32.    Plan 1.  Patient to have focus on exercise, diet and healthy lifestyle.    2.  Monthly self examination    3.  Yearly  mammogram along with MRI of the breast    4.  Since patient is postmenopausal given bilateral salpingo-oophorectomy,  Patient is willing to consider Aromasin(exemestane) 25 mg once daily for chemoprophylaxis    5.  Follow-up with me in 8 weeks for toxicity check    Lissette Soriano MD     CC: Dr. Eri Bass

## 2018-12-04 PROBLEM — Z80.3 FAMILY HISTORY OF BREAST CANCER: Status: ACTIVE | Noted: 2018-12-04

## 2018-12-04 LAB
ALBUMIN SERPL-MCNC: 4.4 G/DL (ref 3.5–5.2)
ALBUMIN/GLOB SERPL: 1.7 G/DL (ref 1.1–2.4)
ALP SERPL-CCNC: 90 U/L (ref 38–116)
ALT SERPL W P-5'-P-CCNC: 19 U/L (ref 0–33)
ANION GAP SERPL CALCULATED.3IONS-SCNC: 15.9 MMOL/L
AST SERPL-CCNC: 18 U/L (ref 0–32)
BILIRUB SERPL-MCNC: 0.3 MG/DL (ref 0.1–1.2)
BUN BLD-MCNC: 15 MG/DL (ref 6–20)
BUN/CREAT SERPL: 18.3 (ref 7.3–30)
CALCIUM SPEC-SCNC: 9.5 MG/DL (ref 8.5–10.2)
CHLORIDE SERPL-SCNC: 104 MMOL/L (ref 98–107)
CO2 SERPL-SCNC: 24.1 MMOL/L (ref 22–29)
CREAT BLD-MCNC: 0.82 MG/DL (ref 0.6–1.1)
GFR SERPL CREATININE-BSD FRML MDRD: 77 ML/MIN/1.73
GLOBULIN UR ELPH-MCNC: 2.6 GM/DL (ref 1.8–3.5)
GLUCOSE BLD-MCNC: 102 MG/DL (ref 74–124)
POTASSIUM BLD-SCNC: 4 MMOL/L (ref 3.5–4.7)
PROT SERPL-MCNC: 7 G/DL (ref 6.3–8)
SODIUM BLD-SCNC: 144 MMOL/L (ref 134–145)

## 2018-12-06 ENCOUNTER — TELEPHONE (OUTPATIENT)
Dept: SURGERY | Facility: CLINIC | Age: 38
End: 2018-12-06

## 2019-01-28 ENCOUNTER — DOCUMENTATION (OUTPATIENT)
Dept: ONCOLOGY | Facility: CLINIC | Age: 39
End: 2019-01-28

## 2019-01-28 ENCOUNTER — APPOINTMENT (OUTPATIENT)
Dept: ONCOLOGY | Facility: CLINIC | Age: 39
End: 2019-01-28

## 2019-01-28 ENCOUNTER — TELEPHONE (OUTPATIENT)
Dept: ONCOLOGY | Facility: CLINIC | Age: 39
End: 2019-01-28

## 2019-01-28 ENCOUNTER — APPOINTMENT (OUTPATIENT)
Dept: LAB | Facility: HOSPITAL | Age: 39
End: 2019-01-28

## 2019-01-28 NOTE — TELEPHONE ENCOUNTER
----- Message from Lissette Soriano MD sent at 1/28/2019 10:54 AM EST -----  Regarding: RE: PT CANC APPT TODAY  This patient refused chemoprophylaxis as she is concerned about side effects.  We had prescribed her Aromasin but on further reading the side effects she refused to take it.  She tells me that she has an appointment with you and her gynecologist every 6 months for breast exam and follow-up per of her routine imaging.  So she will not be following up with us.  I did tell the patient to make sure she follows up with you.  Thanks.    Lissette Soriano MD  ----- Message -----  From: Carolina Navarrete  Sent: 1/28/2019  10:15 AM  To: Lissette Soriano MD  Subject: PT CANC APPT TODAY                               PT LEFT VOICEMAIL MESSAGE WITH PRE-REGISTRATION STATING SHE WISHED TO CANCEL THIS APPT, DID NOT WANT TO RESCHEDULE AT THIS TIME.  FYI ONLY

## 2019-01-28 NOTE — PROGRESS NOTES
Patient apparently called and canceled her appointment here.  I discussed with the patient today.  She read about the side effects of Aromasin and she refuses to take any medications.  Since she has a follow-up appointment every 6 months between Dr. Bass and her gynecologist, she will receive a breast exam from them every 6 months.  But patient is really concerned about setting medication side effects and does not want to take .  I do not see an appointment with Dr. Bass in the computer and I told her to call their office and make that appointment so she can be examined every 6 months by a physician given her family history of breast cancer.    Since she has 2 physicians following up with her breast exam and mammograms, I think it's reasonable for her not to have an appointment with us.    Lissette Soriano MD

## 2019-07-31 ENCOUNTER — TELEPHONE (OUTPATIENT)
Dept: SURGERY | Facility: CLINIC | Age: 39
End: 2019-07-31

## 2019-07-31 NOTE — TELEPHONE ENCOUNTER
Scheduled Bilateral 3D Screen Mammo  And Bilateral Breast MRI w wo contrast  @ [facility]  10-4-19  Arrive 10:15      Return to see Dr MICHAEL 10-14-19 arrive 10:45      Spoke to pt she v/u with appts  nirali

## 2019-10-04 ENCOUNTER — HOSPITAL ENCOUNTER (OUTPATIENT)
Dept: MAMMOGRAPHY | Facility: HOSPITAL | Age: 39
Discharge: HOME | End: 2019-10-04
Admitting: SURGERY

## 2019-10-04 ENCOUNTER — HOSPITAL ENCOUNTER (OUTPATIENT)
Dept: MRI IMAGING | Facility: HOSPITAL | Age: 39
Discharge: HOME | End: 2019-10-04

## 2019-10-04 DIAGNOSIS — Z80.3 FH: BREAST CANCER: ICD-10-CM

## 2019-10-04 PROCEDURE — 0 GADOBENATE DIMEGLUMINE 529 MG/ML SOLUTION: Performed by: SURGERY

## 2019-10-04 PROCEDURE — 77063 BREAST TOMOSYNTHESIS BI: CPT

## 2019-10-04 PROCEDURE — 77067 SCR MAMMO BI INCL CAD: CPT

## 2019-10-04 PROCEDURE — A9577 INJ MULTIHANCE: HCPCS | Performed by: SURGERY

## 2019-10-04 PROCEDURE — 77049 MRI BREAST C-+ W/CAD BI: CPT

## 2019-10-04 RX ADMIN — GADOBENATE DIMEGLUMINE 20 ML: 529 INJECTION, SOLUTION INTRAVENOUS at 13:19

## 2019-10-14 ENCOUNTER — TELEPHONE (OUTPATIENT)
Dept: SURGERY | Facility: CLINIC | Age: 39
End: 2019-10-14

## 2019-10-14 DIAGNOSIS — R92.8 ABNORMAL MRI, BREAST: Primary | ICD-10-CM

## 2019-10-14 NOTE — TELEPHONE ENCOUNTER
MRI [facility] dated 10-7-19 showed RIGHT breast middle third 9:30, 9 CFN 1cm mass the lesion is not seen on prior MRI examinations.  A mammographic correlate is visualized and features suggestive of a lymph node.  Recommend spot compression right breast and right breast ultrasound.  BI-RADS 0.  No findings suspicious on the left.  We will arrange that and see her back after.    [facility] October 28th, 2019 right diagnostic mammogram and right breast ultrasound: There is visualization of an area of increased density measuring 8 mm mammographically.  Located near 9 o'clock position in the expected location of the MRI lesion.  Ultrasound at 9:00, 8 cm from the nipple shows a vague area of heterogenous echotexture measuring 1 cm.  Correlates with the MRI visualized density.  1 cm ill-defined lesion at 9:00, 8 cm from the nipple recommend ultrasound-guided biopsy.  BI-RADS 4.  We will arrange for Dr. David to do a right breast ultrasound-guided biopsy and have her see us back after.    [facility] October fourth, 2019 bilateral screening mammogram with 3D.  Fatty replaced breast.  BI-RADS 1

## 2019-10-17 ENCOUNTER — TELEPHONE (OUTPATIENT)
Dept: SURGERY | Facility: CLINIC | Age: 39
End: 2019-10-17

## 2019-10-17 NOTE — TELEPHONE ENCOUNTER
Scheduled Rt 3D diag mammo and  Rt breast u/s at [facility] 10-28-19 @ 2:00      Spoke to pt iglesia v/u    nirali

## 2019-10-28 ENCOUNTER — HOSPITAL ENCOUNTER (OUTPATIENT)
Dept: MAMMOGRAPHY | Facility: HOSPITAL | Age: 39
Discharge: HOME | End: 2019-10-28
Admitting: SURGERY

## 2019-10-28 ENCOUNTER — HOSPITAL ENCOUNTER (OUTPATIENT)
Dept: ULTRASOUND IMAGING | Facility: HOSPITAL | Age: 39
Discharge: HOME | End: 2019-10-28

## 2019-10-28 DIAGNOSIS — R92.8 ABNORMAL MRI, BREAST: ICD-10-CM

## 2019-10-28 PROCEDURE — 77065 DX MAMMO INCL CAD UNI: CPT

## 2019-10-28 PROCEDURE — 76642 ULTRASOUND BREAST LIMITED: CPT

## 2019-10-28 PROCEDURE — G0279 TOMOSYNTHESIS, MAMMO: HCPCS

## 2019-10-30 ENCOUNTER — TELEPHONE (OUTPATIENT)
Dept: SURGERY | Facility: CLINIC | Age: 39
End: 2019-10-30

## 2019-10-30 NOTE — TELEPHONE ENCOUNTER
Scheduled with Carolina in mammo dept  Rt Breast US Guided Breast Bx   to do.  10-31-19 arrive 9:30      Return to see Dr. MICHAEL 11-14-19 arrive 10:45    Spoke to pt she v/u with appts  jaredl

## 2019-10-31 ENCOUNTER — HOSPITAL ENCOUNTER (OUTPATIENT)
Dept: ULTRASOUND IMAGING | Facility: HOSPITAL | Age: 39
Discharge: HOME | End: 2019-10-31
Admitting: SURGERY

## 2019-10-31 ENCOUNTER — HOSPITAL ENCOUNTER (OUTPATIENT)
Dept: MAMMOGRAPHY | Facility: HOSPITAL | Age: 39
Discharge: HOME | End: 2019-10-31

## 2019-10-31 VITALS
SYSTOLIC BLOOD PRESSURE: 153 MMHG | OXYGEN SATURATION: 98 % | TEMPERATURE: 97.4 F | HEART RATE: 79 BPM | HEIGHT: 67 IN | RESPIRATION RATE: 18 BRPM | WEIGHT: 255 LBS | DIASTOLIC BLOOD PRESSURE: 96 MMHG | BODY MASS INDEX: 40.02 KG/M2

## 2019-10-31 DIAGNOSIS — R69 DIAGNOSIS UNKNOWN: ICD-10-CM

## 2019-10-31 DIAGNOSIS — IMO0002 MASS: ICD-10-CM

## 2019-10-31 PROCEDURE — A4648 IMPLANTABLE TISSUE MARKER: HCPCS

## 2019-10-31 PROCEDURE — 88305 TISSUE EXAM BY PATHOLOGIST: CPT | Performed by: SURGERY

## 2019-10-31 PROCEDURE — 77065 DX MAMMO INCL CAD UNI: CPT

## 2019-10-31 PROCEDURE — 25010000003 LIDOCAINE 1 % SOLUTION: Performed by: RADIOLOGY

## 2019-10-31 RX ORDER — LIDOCAINE HYDROCHLORIDE 10 MG/ML
20 INJECTION, SOLUTION INFILTRATION; PERINEURAL ONCE
Status: COMPLETED | OUTPATIENT
Start: 2019-10-31 | End: 2019-10-31

## 2019-10-31 RX ORDER — LIDOCAINE HYDROCHLORIDE AND EPINEPHRINE 10; 10 MG/ML; UG/ML
20 INJECTION, SOLUTION INFILTRATION; PERINEURAL ONCE
Status: COMPLETED | OUTPATIENT
Start: 2019-10-31 | End: 2019-10-31

## 2019-10-31 RX ORDER — DIAZEPAM 5 MG/1
5 TABLET ORAL ONCE AS NEEDED
Status: DISCONTINUED | OUTPATIENT
Start: 2019-10-31 | End: 2019-11-01 | Stop reason: HOSPADM

## 2019-10-31 RX ADMIN — LIDOCAINE HYDROCHLORIDE 9 ML: 10 INJECTION, SOLUTION INFILTRATION; PERINEURAL at 10:25

## 2019-10-31 RX ADMIN — LIDOCAINE HYDROCHLORIDE AND EPINEPHRINE 7 ML: 10; 10 INJECTION, SOLUTION INFILTRATION; PERINEURAL at 10:26

## 2019-11-01 LAB
CYTO UR: NORMAL
LAB AP CASE REPORT: NORMAL
LAB AP CLINICAL INFORMATION: NORMAL
PATH REPORT.FINAL DX SPEC: NORMAL
PATH REPORT.GROSS SPEC: NORMAL

## 2019-11-04 ENCOUNTER — TELEPHONE (OUTPATIENT)
Dept: SURGERY | Facility: CLINIC | Age: 39
End: 2019-11-04

## 2019-11-04 NOTE — TELEPHONE ENCOUNTER
November 1, 2019 ultrasound-guided right breast biopsy and placement with clip by Dr. David: 9:00, 8 cm from the nipple 11-gauge multiple specimens were taken.  Pathology returned as fat necrosis which is concordant.  Postbiopsy mammogram shows adequate sampling.  We will call and let her know benign.    Clinical Information    Right Breast Mass 0900 8cm Not For Calcifications/Time in Formalin - 1029   Final Diagnosis   1. Breast, Right, 9 o'clock 8 cm from Nipple, Core Biopsy: Benign breast tissue with                A. Fat necrosis.               B. Extensive chronic inflammation.     ankur/[place]

## 2019-11-14 ENCOUNTER — OFFICE VISIT (OUTPATIENT)
Dept: SURGERY | Facility: CLINIC | Age: 39
End: 2019-11-14

## 2019-11-14 VITALS
HEIGHT: 67 IN | HEART RATE: 76 BPM | WEIGHT: 257 LBS | DIASTOLIC BLOOD PRESSURE: 95 MMHG | OXYGEN SATURATION: 98 % | SYSTOLIC BLOOD PRESSURE: 142 MMHG | BODY MASS INDEX: 40.34 KG/M2

## 2019-11-14 DIAGNOSIS — R92.8 ABNORMAL MRI, BREAST: ICD-10-CM

## 2019-11-14 DIAGNOSIS — N64.1 FAT NECROSIS OF BREAST: Primary | ICD-10-CM

## 2019-11-14 DIAGNOSIS — Z80.3 FH: BREAST CANCER: ICD-10-CM

## 2019-11-14 DIAGNOSIS — E66.01 MORBID OBESITY WITH BMI OF 40.0-44.9, ADULT (HCC): ICD-10-CM

## 2019-11-14 DIAGNOSIS — N60.19 FIBROCYSTIC BREAST DISEASE (FCBD), UNSPECIFIED LATERALITY: ICD-10-CM

## 2019-11-14 DIAGNOSIS — D68.51 FACTOR V LEIDEN (HCC): ICD-10-CM

## 2019-11-14 PROCEDURE — 99213 OFFICE O/P EST LOW 20 MIN: CPT | Performed by: SURGERY

## 2019-11-14 NOTE — PROGRESS NOTES
Chief Complaint: Marianne Sawyer is a 39 y.o. female who was seen in consultation at the request of No ref. provider found  for family history breast cancer, a followup visit and a post-biopsy visit    History of Present Illness:  Patient presents with family history breast cancer and previous breast biopsy.. She noted no new masses, skin changes, nipple discharge, nipple changes prior to her most recent imaging.  Her most recent imaging includes the following: [facility] bilateral screening mammogram September 20, 2016 fatty replaced breast, BI-RADS 1.    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  Cores returned as fibrocystic change with calcifications.    She has her uterus and ovaries removed for fibroids and bleeding, is postmenopausal, and has taken estradiol for the past 4 years since her 2012 hysterectomy.  Her family history includes the following:     She has one daughter, one son, 2 sisters, one maternal aunt, 4 paternal aunts.  Her maternal grandmother had breast cancer at age 70, her mother had breast cancer in her 50s.  One sister had breast cancer at age 47.  The other sister had breast cancer age 48.  One of her sisters is a patient of ours named Natalia Patricio.      In the interim,  Marianne Sawyer  has done well.  She has noted no changes in her breast exam. No new masses, skin changes, nipple changes, nipple discharge either breast.   She saw genetics and they felt that she did not need testing since her sister had tested negative for mutation.    Her most recent imaging includes the following:  Screening mammogram bilateral [facility] September 29, 2017.  BI-RADS 2.  Scattered fibroglandular densities.  MRI breast bilateral [facility] September 29, 2017 Dr. Hossein David.  Area of enhancement of questionable character with ill-defined marked  neurologic features at the 4:00 right breast, 13 cm from the nipple location correlation with diagnostic mammogram and ultrasound.  No suspicious findings in the left breast.  BI-RADS 0.     October 6, 2017 right diagnostic mammogram and right breast ultrasound.  Attention to the 4 o'clock position.  No architectural distortion, mass or area of increased density has developed.  Scattered fiber glandular densities.  Ultrasound in her inferior right breast no cystic or solid mass identified.  At the 3:00, 13 cm from the nipple location a small area of acoustic shadowing noted secondary to normal soft tissue interface.  BI-RADS 2 with return to normal routine mammogram and MRI    She stopped taking estrogen altogether in July 2017.    She has gained 2 #.      In the interim,  Marianne Sawyer  has done well.  She has noted no changes in her breast exam. No new masses, skin changes, nipple changes, nipple discharge either breast.     Her most recent imaging includes the following:  Bilateral screening mammogram dated October 2, 2018.  The parenchyma is fatty-replaced.  No evidence of malignancy in either breast.  BI-RADS 1     Bilateral breast MRI October 2, 2018 [facility].  No findings suspicious for cancer in either breast BI-RADS 1.      She has seen Dr Park from [place] and she was not pleased with that visit.      October 22, 2018 right breast ultrasound [facility]: At the 10:00, 3 cm from the nipple location there is a 5 mm mass heterogenous with surrounding subtle echogenic halo.  Correlation with ultrasound guided right breast biopsy recommended BI-RADS 4.   November 5, 2018 right breast ultrasound-guided biopsy 10:00, 3 cm from the nipple.  11-gauge mammotome, multiple tissue specimens.  Pathology result returned as fat necrosis which is concordant.    She reports some bruising.    Interval History:    In the interim,  Marianne FREDERIC Silverio  has done well.  She has noted no changes in  her breast exam. No new masses, skin changes, nipple changes, nipple discharge either breast.     Her most recent imaging includes the following:    [facility] October fourth, 2019 bilateral screening mammogram with 3D.  Fatty replaced breast.  BI-RADS 1      MRI [facility] dated 10-7-19 showed RIGHT breast middle third 9:30, 9 CFN 1cm mass the lesion is not seen on prior MRI examinations.  A mammographic correlate is visualized and features suggestive of a lymph node.  Recommend spot compression right breast and right breast ultrasound.  BI-RADS 0.  No findings suspicious on the left.  We will arrange that and see her back after.     [facility] October 28th, 2019 right diagnostic mammogram and right breast ultrasound: There is visualization of an area of increased density measuring 8 mm mammographically.  Located near 9 o'clock position in the expected location of the MRI lesion.  Ultrasound at 9:00, 8 cm from the nipple shows a vague area of heterogenous echotexture measuring 1 cm.  Correlates with the MRI visualized density.  1 cm ill-defined lesion at 9:00, 8 cm from the nipple recommend ultrasound-guided biopsy.  BI-RADS 4.  We will arrange for Dr. David to do a right breast ultrasound-guided biopsy and have her see us back after.       November 1, 2019 ultrasound-guided right breast biopsy and placement with clip by Dr. David: 9:00, 8 cm from the nipple 11-gauge multiple specimens were taken.  Pathology returned as fat necrosis which is concordant.  Postbiopsy mammogram shows adequate sampling.    SHe reports minmal bruisnig or tenderness from the biopsy.    Patient saw Dr Soriano  and decided not to start aromasin.    She is here to review.    Review of Systems:  Review of Systems   Constitutional: Negative for unexpected weight change (5 lb wt loss).   Psychiatric/Behavioral: Positive for depression.   All other systems reviewed and are negative.       Past Medical and Surgical  History:  Breast Biopsy History:  Patient has had the following breast biopsies:2013 A FIBROCYSTIC DISEASE WITH MICROCALCIFICATIONS.   Breast Cancer HIstory:  Patient does not have a past medical history of breast cancer.  Breast Operations, and year:  None   Obstetric/Gynecologic History:  Age menstrual periods began: 11 yrs old   Patient is postmenopausal due to removal of her uterus and both ovaries in the following year: 3/2012   Number of pregnancies: 2  Number of live births: 2  Number of abortions or miscarriages: 0  Age of delivery of first child: 23 yrs old   Patient did not breast feed.  Length of time taking birth control pills: 19 yrs   Patient took hormone replacement during the following dates: Estradiol 4 yrs  Patient had uterus and ovaries removed     Past Surgical History:   Procedure Laterality Date   • BREAST BIOPSY Left     benign    •  SECTION  2003, 2007    HELLP syndrome   • DILATATION AND CURETTAGE     • LASER ABLATION      eye-bilateral   • LASIK     • SKIN LESION EXCISION      benign-back   • TOTAL ABDOMINAL HYSTERECTOMY WITH SALPINGO OOPHORECTOMY      fibroids and bleeding       Past Medical History:   Diagnosis Date   • Depression    • Factor 5 Leiden mutation, heterozygous (CMS/HCC)    • H/O Gestational diabetes    • History of foreign travel 2018    [facility], [place]; [place]   • Lump of breast, right    • Mastodynia    • Mood disorder (CMS/HCC)    • Tattoos        Prior Hospitalizations, other than for surgery or childbirth, and year:  None     Social History     Socioeconomic History   • Marital status:      Spouse name: Gus   • Number of children: 2   • Years of education: College   • Highest education level: Not on file   Occupational History   • Occupation: RN     Employer: [employer]   Tobacco Use   • Smoking status: Never Smoker   • Smokeless tobacco: Never Used   Substance and Sexual Activity   •  "Alcohol use: Yes     Comment: beer occasionally   • Drug use: No   • Sexual activity: Defer     Patient is .  Patient is employed full time with the following occupation: RN  Patient drinks 1 servings of caffeine per day.    Family History:  Family History   Problem Relation Age of Onset   • Asthma Mother    • COPD Mother    • Deep vein thrombosis Mother    • Depression Mother    • Breast cancer Mother         40s   • Hypertension Mother    • Pulmonary embolism Mother    • Clotting disorder Father         Factor V   • Deep vein thrombosis Father    • Hypertension Father    • Hyperlipidemia Father    • Diabetes Father    • Heart disease Father    • Clotting disorder Sister         Factor V   • Depression Sister    • Breast cancer Sister 47   • Breast cancer Paternal Aunt         60-70s   • Breast cancer Maternal Grandmother         70s   • Esophageal cancer Maternal Grandfather         90s   • Lung cancer Paternal Grandmother    • Breast cancer Sister 48       Vital Signs:  /95   Pulse 76   Ht 170.2 cm (67\")   Wt 117 kg (257 lb)   LMP  (LMP Unknown)   SpO2 98%   Breastfeeding? No   BMI 40.25 kg/m²      Medications:  No current outpatient medications on file.     Allergies:  Allergies   Allergen Reactions   • Cleocin [Clindamycin Hcl] Anaphylaxis   • Ceclor [Cefaclor] Unknown (See Comments)     unknown   • Sulfa Antibiotics Rash       Physical Examination:  /95   Pulse 76   Ht 170.2 cm (67\")   Wt 117 kg (257 lb)   LMP  (LMP Unknown)   SpO2 98%   Breastfeeding? No   BMI 40.25 kg/m²   General Appearance:  Patient is in no distress.  She is well kept and has an morbidly obese build.   Psychiatric:  Patient with appropriate mood and affect. Alert and oriented to self, time, and place.    Breast, RIGHT:  large sized, symmetric with the contralateral side.  Breast skin is without erythema, edema, rashes.  There are no visible abnormalities upon inspection during the arm-raising maneuver or " with hands on hips in the sitting position. There is no nipple retraction, discharge or nipple/areolar skin changes.   There are no masses palpable in the sitting or supine positions.    Breast, LEFT:  large sized, symmetric with the contralateral side.  Breast skin is without erythema, edema, rashes.  There are no visible abnormalities upon inspection during the arm-raising maneuver or with hands on hips in the sitting position. There is no nipple retraction, discharge or nipple/areolar skin changes.There are no masses palpable in the sitting or supine positions. Well healing mammotomy lateral RIGHT breast with no erythema, warmth, drainage. Minimal ecchymoses.      Lymphatic:  There is no axillary, cervical, infraclavicular, or supraclavicular adenopathy bilaterally.  Eyes:  Pupils are round and reactive to light.  Cardiovascular:  Heart rate and rhythm are regular.  Respiratory:  Lungs are clear bilaterally with no crackles or wheezes in any lung field.  Gastrointestinal:  Abdomen is soft, nondistended, and nontender.    Musculoskeletal:  Good strength in all 4 extremities.   There is good range of motion in both shoulders.    Skin:  No new skin lesions or rashes on the skin excluding the breast (see breast exam above).    Imagin13  [facility]  SCREENING BILATERAL MAMMOGRAM  GERALD CATRACHITO R  Scattered fibrogladular densities.   Finding 1: round mass upper inner quadrant right breast.   Finding 2: pleomorphic calcifications upper outer quadrant of the left breast.   IMPRESSION:   Finding 1: requires additional evaluation.   Finding 2: require additional evaluation.   BIRADS Category 0    13 [facility] [place]  BILATERAL DIGITAL SPOTS-BILATERAL  GERALD, CATRACHITO R  Scattered fibrogladular densities.   Finding 1: additional evaluation round mass upper inner region right breast does persist. This could possibly represent an intramammary lymph node.   Finding 2:  calcifications in the upper outer region of the left breast. Calcifiactuib have a suspicious appearance for malignancy and biopsy is recommended.   ULTRASOUND FINDINGS:   Finding 1: no discrete solid or cystic mass or area of abnormal shadowing. Probably benign. Recommend a right diagnostic mammogram in 6 months.   Finding 2: suspicious  BI-RADS Category 4    09/09/13  [facility]  MIKIE MAMMOGRAM DIAG GERALD, CATRACHITO   Scattered fibrogladular densities.   BI-RADS 2=Benign Findings     01/13/14  [facility]  UNI MARLEEN MAMMO GERALD, CATRACHITO  Scattered fibrogladular densities. BI-RADS 1-Negative     09/15/14  [facility]  MIKIE SCREENING MAMMO GERALD, CATRACHITO  PREDOMINANTLY FATTY BI-RADS 2    09/21/15  [facility]   SCREEN MAMMO   GERALD, CATRACHITO   BREAST COMPSITION: 1, predominately fatty    09/28/16  [facility]  MG MIKIE SCR MAMMO  GERALD, CATRACHITO   BREAST COMPOSITION: 1 PREDOMINATLEY FATTY    Bilateral screening mammogram dated October 2, 2018.  The parenchyma is fatty-replaced.  No evidence of malignancy in either breast.  BI-RADS 1     Bilateral breast MRI October 2, 2018 [facility].  No findings suspicious for cancer in either breast BI-RADS 1.    [facility] October fourth, 2019 bilateral screening mammogram with 3D.  Fatty replaced breast.  BI-RADS 1      MRI [facility] dated 10-7-19 showed RIGHT breast middle third 9:30, 9 CFN 1cm mass the lesion is not seen on prior MRI examinations.  A mammographic correlate is visualized and features suggestive of a lymph node.  Recommend spot compression right breast and right breast ultrasound.  BI-RADS 0.  No findings suspicious on the left.       [facility] October 28th, 2019 right diagnostic mammogram and right breast ultrasound: There is visualization of an area of increased density measuring 8 mm mammographically.  Located near 9 o'clock position in the expected location of the MRI  lesion.  Ultrasound at 9:00, 8 cm from the nipple shows a vague area of heterogenous echotexture measuring 1 cm.  Correlates with the MRI visualized density.  1 cm ill-defined lesion at 9:00, 8 cm from the nipple recommend ultrasound-guided biopsy.  BI-RADS 4.           Pathology:  02/25/13 [facility] [place], [place]  STEREO MAMMOTOME LT   CATRACHITO DUARTE FREDERIC  An 11-gauge mammotome. Ultra-gel trenton system. Post-clip stereo views and mammogram revealed the clip to be in adequate position. Six month mammogram recommended for follow up.     03/04/13  [facility]   SURGICAL PATHOLOGY  IKE DUARTEKAY DE LA GARZA.   FINAL PATHOLOGIC DIAGNOSIS  A FIBROCYSTIC DISEASE WITH MICROCALCIFICATIONS.     November 5, 2018 right breast ultrasound-guided biopsy 10:00, 3 cm from the nipple.  11-gauge mammotome, multiple tissue specimens.  Pathology result returned as fat necrosis which is concordant.  We will let her know.     Final Diagnosis   1. Breast, Right 10 o'clock position 3 CMFN, Core Biopsy:               A. Fat necrosis.            Dr Hopkins verbal report of consistent with fat necrosis.    November 1, 2019 ultrasound-guided right breast biopsy and placement with clip by Dr. David: 9:00, 8 cm from the nipple 11-gauge multiple specimens were taken.  Pathology returned as fat necrosis which is concordant.  Postbiopsy mammogram shows adequate sampling.  We will call and let her know benign.     Clinical Information     Right Breast Mass 0900 8cm Not For Calcifications/Time in Formalin - 1029   Final Diagnosis   1. Breast, Right, 9 o'clock 8 cm from Nipple, Core Biopsy: Benign breast tissue with                A. Fat necrosis.               B. Extensive chronic inflammation.     ankur/pkm                 Procedures:      Assessment:   Diagnosis Plan   1. Fat necrosis of breast     2. Abnormal MRI, breast     3. Fibrocystic breast disease (FCBD), unspecified laterality     4. FH: breast cancer     5. Factor V Leiden (CMS/HCC)     6.  Morbid obesity with BMI of 40.0-44.9, adult (CMS/HCC)          1-2  RIGHT 11:00, 4 CFN  percutaneous biopsy of papable finding that resembled fat necrosis on ultrasound- returned as fat necrosis.     RIGHT 9:00, 8 CFN- seen on MRI screening- US biopsy returned as fat necrosis- concordant    3-  Left breast calcifications March 2013, fibrocystic change with calcifications on stereotactic biopsy.    4-  Maternal grandmother age 70, mother age 55, sister #1 age 47, Sister #2 age 48.  Sister Natalia Curry, 1-8-68- is a patient of ours who had [facility] genetic testing that was a breast-ovarian cancer panel and negative for mutation   - patient saw genetics per Dr Main, and did not have testing since her family member had.  -stopped estradiol 7-2017  - saw Dr Soriano- - decided not to take aromasin.    5-  Factor V heterozygote, inherited from her father.  Father had deep venous thrombosis.  Patient has not had a DVT or pulmonary embolus.  She did take heparin with a pregnancy.  She did not take heparin or Lovenox perioperatively surrounding her hysterectomy.    6-  BMI 40    Plan:  We reviewed her interval history,exam, imaging and imaging reports together today.     She is healing fine from her biopsy.  We discussed the nature of fat necrosis.  She will need no additional dedicated imaging to follow this.    Previously,I calculated her lifetime risk of breast cancer using the mastery of breast surgery  model as: 30-56%  Her 5 year risk for developing breast cancer is 2.8-8.7%    With a lifetime risk of breast cancer greater than 20%, the current recommendations for screening include annual mammography and annual MRI, with clinical examination.   We are currently in surveillance.  Her next mammogram and MRI are due 10-8-20- I gave her this reminder today.      We also discussed that for a Josselin model 5 year risk greater than 1.7% or LCIS, and a life expectancy > 10 years, that we recommend  risk reduction counseling with the medical oncologists about chemopreventive agents such as tamoxifen, raloxifene, or exemestane.   She has seen Dr Soriano and decided that she does not wish to take this aromasin.    I have not given her a routine Fu in our office. She is going to arrange her breast imaging and exam through her gynecologist and will continue to have her imaging at [facility].      Son, her sister, who is a patient of ours, sees Dr. Main from medical oncology, and he and his office are working on getting her into a clinical trial at [facility] based on her family history and lack of genetic mutation.    I asked her to continue her SBE monthly, clinical exam and imaging annually and to call us in the future with concerns and we're happy to see her back.      Rocío Bass MD            We have spent 20 minutes in visit today, 12 in face to face .      Next Appointment:  Return for any future concerns.      EMR Dragon/transcription disclaimer:    Much of this encounter note is an electronic transcription/translocation of spoken language to printed text.  The electronic translation of spoken language may permit erroneous, or at times, nonsensical words or phrases to be inadvertently transcribed.  Although I have reviewed the note from such areas, some may still exist.

## 2020-09-24 ENCOUNTER — TRANSCRIBE ORDERS (OUTPATIENT)
Dept: ADMINISTRATIVE | Facility: HOSPITAL | Age: 40
End: 2020-09-24

## 2020-09-24 DIAGNOSIS — Z12.31 VISIT FOR SCREENING MAMMOGRAM: ICD-10-CM

## 2020-09-24 DIAGNOSIS — Z80.3 FAMILY HISTORY OF BREAST CANCER: Primary | ICD-10-CM

## 2020-12-22 ENCOUNTER — HOSPITAL ENCOUNTER (OUTPATIENT)
Dept: MAMMOGRAPHY | Facility: HOSPITAL | Age: 40
Discharge: HOME | End: 2020-12-22
Admitting: OBSTETRICS & GYNECOLOGY

## 2020-12-22 DIAGNOSIS — Z12.31 VISIT FOR SCREENING MAMMOGRAM: ICD-10-CM

## 2020-12-22 PROCEDURE — 77063 BREAST TOMOSYNTHESIS BI: CPT

## 2020-12-22 PROCEDURE — 77067 SCR MAMMO BI INCL CAD: CPT

## 2021-06-02 ENCOUNTER — HOSPITAL ENCOUNTER (OUTPATIENT)
Dept: MRI IMAGING | Facility: HOSPITAL | Age: 41
Discharge: HOME | End: 2021-06-02
Admitting: OBSTETRICS & GYNECOLOGY

## 2021-06-02 DIAGNOSIS — Z80.3 FAMILY HISTORY OF BREAST CANCER: ICD-10-CM

## 2021-06-02 PROCEDURE — A9577 INJ MULTIHANCE: HCPCS | Performed by: OBSTETRICS & GYNECOLOGY

## 2021-06-02 PROCEDURE — 77049 MRI BREAST C-+ W/CAD BI: CPT

## 2021-06-02 PROCEDURE — 0 GADOBENATE DIMEGLUMINE 529 MG/ML SOLUTION: Performed by: OBSTETRICS & GYNECOLOGY

## 2021-06-02 RX ADMIN — GADOBENATE DIMEGLUMINE 20 ML: 529 INJECTION, SOLUTION INTRAVENOUS at 13:04

## 2021-10-22 ENCOUNTER — TRANSCRIBE ORDERS (OUTPATIENT)
Dept: ADMINISTRATIVE | Facility: HOSPITAL | Age: 41
End: 2021-10-22

## 2021-10-22 DIAGNOSIS — Z12.31 VISIT FOR SCREENING MAMMOGRAM: Primary | ICD-10-CM

## 2021-10-22 DIAGNOSIS — Z80.3 FAMILY HISTORY OF BREAST CANCER: ICD-10-CM

## 2022-02-02 ENCOUNTER — HOSPITAL ENCOUNTER (OUTPATIENT)
Dept: MAMMOGRAPHY | Facility: HOSPITAL | Age: 42
Discharge: HOME | End: 2022-02-02
Admitting: OBSTETRICS & GYNECOLOGY

## 2022-02-02 DIAGNOSIS — Z12.31 VISIT FOR SCREENING MAMMOGRAM: ICD-10-CM

## 2022-02-02 PROCEDURE — 77067 SCR MAMMO BI INCL CAD: CPT

## 2022-02-02 PROCEDURE — 77063 BREAST TOMOSYNTHESIS BI: CPT

## 2022-08-02 ENCOUNTER — HOSPITAL ENCOUNTER (OUTPATIENT)
Dept: MRI IMAGING | Facility: HOSPITAL | Age: 42
Discharge: HOME | End: 2022-08-02
Admitting: OBSTETRICS & GYNECOLOGY

## 2022-08-02 DIAGNOSIS — Z80.3 FAMILY HISTORY OF BREAST CANCER: ICD-10-CM

## 2022-08-02 PROCEDURE — 77049 MRI BREAST C-+ W/CAD BI: CPT

## 2022-08-02 PROCEDURE — A9577 INJ MULTIHANCE: HCPCS | Performed by: OBSTETRICS & GYNECOLOGY

## 2022-08-02 PROCEDURE — 0 GADOBENATE DIMEGLUMINE 529 MG/ML SOLUTION: Performed by: OBSTETRICS & GYNECOLOGY

## 2022-08-02 RX ADMIN — GADOBENATE DIMEGLUMINE 20 ML: 529 INJECTION, SOLUTION INTRAVENOUS at 08:58

## 2022-08-03 ENCOUNTER — TRANSCRIBE ORDERS (OUTPATIENT)
Dept: ADMINISTRATIVE | Facility: HOSPITAL | Age: 42
End: 2022-08-03

## 2022-08-03 DIAGNOSIS — R92.8 ABNORMAL MAMMOGRAM: Primary | ICD-10-CM

## 2022-08-23 ENCOUNTER — HOSPITAL ENCOUNTER (OUTPATIENT)
Dept: ULTRASOUND IMAGING | Facility: HOSPITAL | Age: 42
Discharge: HOME | End: 2022-08-23

## 2022-08-23 ENCOUNTER — HOSPITAL ENCOUNTER (OUTPATIENT)
Dept: MAMMOGRAPHY | Facility: HOSPITAL | Age: 42
Discharge: HOME | End: 2022-08-23

## 2022-08-23 DIAGNOSIS — R92.8 ABNORMAL MAMMOGRAM: ICD-10-CM

## 2022-08-23 PROCEDURE — 77065 DX MAMMO INCL CAD UNI: CPT

## 2022-08-23 PROCEDURE — G0279 TOMOSYNTHESIS, MAMMO: HCPCS

## 2022-08-23 PROCEDURE — 76642 ULTRASOUND BREAST LIMITED: CPT

## 2022-08-26 ENCOUNTER — TRANSCRIBE ORDERS (OUTPATIENT)
Dept: ADMINISTRATIVE | Facility: HOSPITAL | Age: 42
End: 2022-08-26

## 2022-08-29 ENCOUNTER — TRANSCRIBE ORDERS (OUTPATIENT)
Dept: ADMINISTRATIVE | Facility: HOSPITAL | Age: 42
End: 2022-08-29

## 2022-08-29 DIAGNOSIS — N64.59 ABNORMAL BREAST FINDING: Primary | ICD-10-CM

## 2022-09-09 ENCOUNTER — APPOINTMENT (OUTPATIENT)
Dept: MAMMOGRAPHY | Facility: HOSPITAL | Age: 42
End: 2022-09-09

## 2022-09-30 ENCOUNTER — HOSPITAL ENCOUNTER (OUTPATIENT)
Dept: ULTRASOUND IMAGING | Facility: HOSPITAL | Age: 42
Discharge: HOME | End: 2022-09-30

## 2022-09-30 ENCOUNTER — HOSPITAL ENCOUNTER (OUTPATIENT)
Dept: MAMMOGRAPHY | Facility: HOSPITAL | Age: 42
Discharge: HOME | End: 2022-09-30

## 2022-09-30 VITALS
HEART RATE: 73 BPM | HEIGHT: 67 IN | RESPIRATION RATE: 16 BRPM | DIASTOLIC BLOOD PRESSURE: 84 MMHG | BODY MASS INDEX: 40.34 KG/M2 | OXYGEN SATURATION: 100 % | TEMPERATURE: 97.8 F | SYSTOLIC BLOOD PRESSURE: 144 MMHG | WEIGHT: 257 LBS

## 2022-09-30 DIAGNOSIS — N64.59 ABNORMAL BREAST FINDING: ICD-10-CM

## 2022-09-30 DIAGNOSIS — Z98.890 STATUS POST RIGHT BREAST BIOPSY: ICD-10-CM

## 2022-09-30 PROCEDURE — A4648 IMPLANTABLE TISSUE MARKER: HCPCS

## 2022-09-30 PROCEDURE — 88305 TISSUE EXAM BY PATHOLOGIST: CPT | Performed by: OBSTETRICS & GYNECOLOGY

## 2022-09-30 PROCEDURE — 0 LIDOCAINE 1 % SOLUTION: Performed by: RADIOLOGY

## 2022-09-30 PROCEDURE — 77065 DX MAMMO INCL CAD UNI: CPT

## 2022-09-30 RX ORDER — LIDOCAINE HYDROCHLORIDE 10 MG/ML
10 INJECTION, SOLUTION INFILTRATION; PERINEURAL ONCE
Status: COMPLETED | OUTPATIENT
Start: 2022-09-30 | End: 2022-09-30

## 2022-09-30 RX ORDER — DIAZEPAM 5 MG/1
5 TABLET ORAL ONCE AS NEEDED
Status: DISCONTINUED | OUTPATIENT
Start: 2022-09-30 | End: 2022-10-01 | Stop reason: HOSPADM

## 2022-09-30 RX ADMIN — LIDOCAINE HYDROCHLORIDE 9 ML: 10 INJECTION, SOLUTION INFILTRATION; PERINEURAL at 10:56

## 2022-09-30 RX ADMIN — LIDOCAINE HYDROCHLORIDE 9 ML: 10; .005 INJECTION, SOLUTION EPIDURAL; INFILTRATION; INTRACAUDAL; PERINEURAL at 10:56

## 2022-10-03 LAB
LAB AP CASE REPORT: NORMAL
LAB AP CLINICAL INFORMATION: NORMAL
LAB AP DIAGNOSIS COMMENT: NORMAL
PATH REPORT.FINAL DX SPEC: NORMAL
PATH REPORT.GROSS SPEC: NORMAL

## 2022-10-17 ENCOUNTER — TELEPHONE (OUTPATIENT)
Dept: SURGERY | Facility: CLINIC | Age: 42
End: 2022-10-17

## 2022-10-17 NOTE — TELEPHONE ENCOUNTER
New patient chart prepared for MD review-Dr. Bass.     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

## 2022-11-04 NOTE — PROGRESS NOTES
[facility]     Referring Provider: No ref. provider found     Chief complaint: papilloma     HPI: Ms. Marianne Sawyer is a 43 yo woman, seen at the request of No ref. provider found, for papilloma    I personally reviewed her records and summarized her relevant breast history/imagin2020 screening mammogram at [facility]  FINDINGS:  The breasts are almost entirely fatty.  There is a biopsy clip in the slightly upper outer middle to posterior  left breast. There are 2 biopsy clips in the upper outer right breast.  There are no suspicious masses, calcifications, or areas of  architectural distortion.  IMPRESSION/RECOMMENDATION(S):  No mammographic evidence of malignancy. Recommend annual screening  mammogram in one year.  BI-RADS Category 2    2021 MRI of breast at [facility]  FINDINGS: There is scattered fibroglandular tissue. There is mild  background parenchymal enhancement.  RIGHT BREAST:    No suspicious enhancing mass or area of non-mass enhancement is  identified.  The visualized axilla is within normal limits.   LEFT BREAST:    No suspicious enhancing mass or area of non-mass enhancement is  identified.  The visualized axilla is within normal limits.   EXTRAMAMMARY FINDINGS:   There are no abnormally enlarged internal mammary chain lymph nodes on  either side.     There is a tiny hiatal hernia.  IMPRESSION AND RECOMMENDATION: No MRI evidence of malignancy in either  breast. Recommend annual screening mammogram in 2021. Consider  screening breast MRI in one year.  BI-RADS Category 1:    2022 screening mammogram at [facility]  FINDINGS:  The breasts are almost entirely fatty.  There are no suspicious masses, calcifications, or areas of  architectural distortion.  IMPRESSION/RECOMMENDATION(S):  No mammographic evidence of malignancy. Recommend annual screening  mammogram in one year.  BI-RADS Category 1    2022 breast MRI at  [facility]  FINDINGS: The breasts are largely composed of fatty soft tissue. Mild  background parenchymal enhancement of both breasts is noted. Within the  axillary tail region of the right breast there is a skin-based 1.2 x 1.6  x 1.2 cm area of focal skin thickening and increased T2 signal with  enhancement. The lesion appears to be centered within the skin and/or  superficial aspect of the axillary tail region of the right breast.  In the posterior one third of the lower inner quadrant of the right  breast at the 5 o'clock position on the order of 14 cm from nipple there  is an area of irregular linear enhancement that measures on the order of  3.3 cm in anterior to posterior dimension, 1.1 cm in the medial to  lateral dimension and 1.0 cm in the superior to inferior dimension.  There is a possible mammographic correlate on the prior mammogram of  02/02/2022.  Otherwise, there are no areas of abnormal enhancement or morphology in  the right breast. I see no evidence for abnormal right breast skin,  nipple or chest wall enhancement and there is no evidence for right  axillary or internal mammary chain adenopathy.  There are no areas of abnormal enhancement or morphology in the left  breast. I see no evidence for abnormal left breast skin, nipple or chest  wall enhancement and there is no evidence for left axillary or internal  mammary chain adenopathy.  IMPRESSION:  1. There is irregular linear enhancement in the posterior one third  lower inner quadrant of the right breast near the 5 o'clock position on  the order of 14 cm from nipple. A possible mammographic correlate is  appreciated on the mammogram of 02/02/2022. Correlation with diagnostic  right breast mammography and targeted right breast sonography are  recommended. If a sonographic and/or mammographic correlate is  appreciated, then correlation with a Tomosynthesis guided and/or  ultrasound-guided right breast biopsy is indicated. If no  correlate is  seen mammographically or sonographically, then correlation with an MR  guided right breast biopsy is recommended.  2. There is a subcutaneous area of focal enhancement in the axillary  tail region of the right breast which may be associated with the skin  lesion. Further evaluation with targeted right breast sonography and a  clinical examination of the region is recommended.  3. There are no findings suspicious for malignancy in the left breast.  BI-RADS category 4:    [facility]/23/2022 [facility]  FINDINGS: Unilateral right digital CC, MLO, exaggerated CC medial and  spot compression 90 degree lateral mammographic and Tomosynthesis images  of the right breast were obtained. Comparison is made to prior  mammography dated 02/02/2022, 12/22/2020 and 10/28/2019. The parenchyma  of the right breast is largely fatty-replaced. Within the posterior  one-third medial aspect of the right breast there is a partially  visualized linear density. Only 1.3 cm of the density can be appreciated  mammographically on the exaggerated CC medial images, but this is  representative of the area of imaging interest seen on MRI. No  suspicious calcifications or new or dominant masses are visualized.  ULTRASOUND: Targeted sonographic evaluation of the lower inner quadrant  of the right breast was performed.  At the 4:30 position on the order of 14 cm from the nipple there is an  irregular heterogenous echogenic and hypoechoic region that measures on  the order of 3.0 x 1.3 x 0.8 cm. Mild peripheral vascularity is noted.  This corresponds to the area of suspicious enhancement seen on the  patient's breast MRI examination.  IMPRESSION:  There is a 3 cm area of ill-defined heterogenous echogenicity in the  right breast at the 4:30 position on the order of 14 cm from the nipple.  This corresponds to the MRI visualized lesion in the right breast.  Correlation with an  ultrasound-guided right breast biopsy is  recommended.  BI-RADS category 4:    9/30/2022 ultrasound-guided breast biopsy and diagnostic right mammogram at [facility]  Postbiopsy unilateral right digital CC and 90 degrees lateral images  were obtained and demonstrate placement of a barbell shaped metallic  clip in the posterior one third lower inner quadrant of the right breast  site of recent biopsy.  The pathology result has returned as clustered microcysts with focal  micropapilloma and duct ectasia. This is concordant with imaging  findings.  IMPRESSION:  Technically successful ultrasound guided Mammotome vacuum assisted right  breast biopsy with placement of a metallic clip. The pathology result  has returned as benign. Routine follow-up breast MRI without and with  contrast and mammography are recommended.  Final Diagnosis   1. Right Breast, 4:30, 14 cm from Nipple, Ultrasound-Guided Biopsies:               A. 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               B. No atypical hyperplasia, in-situ nor invasive carcinoma identified.       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  Cores returned as fibrocystic change with calcifications.     November 5, 2018 right breast ultrasound-guided biopsy 10:00, 3 cm from the nipple.  11-gauge mammotome, multiple tissue specimens.  Pathology result returned as fat necrosis which is concordant.    November 1, 2019 ultrasound-guided right breast biopsy and placement with clip by Dr. David: 9:00, 8 cm from the nipple 11-gauge multiple specimens were taken.  Pathology returned as fat necrosis which is concordant.  Postbiopsy mammogram shows adequate sampling.     Patient saw Dr Soriano  and decided not to start aromasin    She has her uterus and ovaries removed for fibroids and bleeding, is postmenopausal, and has  taken estradiol for the past 4 years since her 2012 hysterectomy.  Her family history includes the following:      She has one daughter, one son, 2 sisters, one maternal aunt, 4 paternal aunts.  Her maternal grandmother had breast cancer at age 70, her mother had breast cancer in her 50s.  One sister had breast cancer at age 47.  The other sister had breast cancer age 48.  One of her sisters is a patient of ours named Natalia Curry.    Today she presents with concerns regarding biopsy and large family history of breast cancer, high risk  She denies any breast lumps, pain, skin changes, or nipple discharge.    She was joined today in clinic by her .  They all participated in the discussion, after her examination, and she gave her consent for them to participate.   She gave consent for her  to be present during her examination and participate in the discussion.   She was by herself in clinic today.     Review of Systems - Oncology    Medications:  No current outpatient medications on file.    Allergies:  Allergies   Allergen Reactions   • Cleocin [Clindamycin Hcl] Anaphylaxis   • Ceclor [Cefaclor] Unknown (See Comments)     unknown   • Sulfa Antibiotics Rash       Medical history:  Past Medical History:   Diagnosis Date   • Depression    • Factor 5 Leiden mutation, heterozygous (HCC)    • H/O Gestational diabetes    • History of foreign travel 2018    [facility], [place]; [place]   • Lump of breast, right    • Mastodynia    • Mood disorder (HCC)    • Tattoos        Surgical History:  Past Surgical History:   Procedure Laterality Date   • BREAST BIOPSY Left     benign    •  SECTION  2003, 2007    HELLP syndrome   • DILATATION AND CURETTAGE     • LASER ABLATION      eye-bilateral   • LASIK     • SKIN LESION EXCISION      benign-back   • TIBIAL PLATEAU HARDWARE REMOVAL Right    • TOTAL ABDOMINAL HYSTERECTOMY WITH SALPINGO OOPHORECTOMY      fibroids and bleeding        Family History:  Family History   Problem Relation Age of Onset   • Asthma Mother    • COPD Mother    • Deep vein thrombosis Mother    • Depression Mother    • Breast cancer Mother         40s   • Hypertension Mother    • Pulmonary embolism Mother    • Clotting disorder Father         Factor V   • Deep vein thrombosis Father    • Hypertension Father    • Hyperlipidemia Father    • Diabetes Father    • Heart disease Father    • Clotting disorder Sister         Factor V   • Depression Sister    • Breast cancer Sister 47   • Thyroid cancer Sister    • Breast cancer Sister 48   • Breast cancer Maternal Grandmother         70s   • Lung cancer Paternal Grandmother    • Breast cancer Paternal Aunt         60-70s   • Esophageal cancer Maternal Grandfather         90s       Social History:   Social History     Socioeconomic History   • Marital status:      Spouse name: Gus   • Number of children: 2   • Years of education: College   Tobacco Use   • Smoking status: Never   • Smokeless tobacco: Never   Substance and Sexual Activity   • Alcohol use: Yes     Comment: beer occasionally   • Drug use: No   • Sexual activity: Defer     Patient drinks 2 servings of caffeine per day.       GYNECOLOGIC HISTORY:   . P: 2. AB: 0.  Last menstrual period:   Age at menarche: 12  Age at first childbirth: 23  Lactation/How long: na  Age at menopause: 32  Total years of oral contraceptive use: 20  Total years of hormone replacement therapy: na      Physical Exam  Vitals:    22 0928   BP: 138/86   Pulse: 85   Resp: 18   SpO2: 99%     ECOG 0 - Asymptomatic  General: NAD, well appearing  Psych: a&o x 3, normal mood and affect  Eyes: EOMI, no scleral icterus  ENMT: neck supple without masses or thyromegaly, mucus membranes moist  Resp: normal effort, CTAB  CV: RRR, no murmurs, no edema   GI: soft, NT, ND  MSK: normal gait, normal ROM in bilateral shoulders  Lymph nodes:  no cervical, supraclavicular or axillary  lymphadenopathy  Breast: symmetric, very large  Right:  No visible abnormalities on inspection while seated, with arms raised or hands on hips. No masses, skin changes, or nipple abnormalities.  Left:  No visible abnormalities on inspection while seated, with arms raised or hands on hips. No masses, skin changes, or nipple abnormalities.      Assessment:    1)  42 y.o. F with a new diagnosis of micropapilloma  2) family history of breast cancer  3) high risk for breast cancer        Discussion:  1. Papilloma was discussed with pt.  Benign process and no excisional surgeon required at this time.  Papilloma was small in size.  2. We discussed management options for individuals who are at increased risk (>20% lifetime risk):  1) High risk screening - Annual mammogram and annual breast MRI, alternating one test every 6 months, biannual clinical exam and monthly self breast exam. She meets criteria for high risk screening.  2) Chemoprevention with Tamoxifen, Raloxifene or Exemestane. These may reduce risk up to 50%. I reviewed that these particular medications are not without risks and the risk/benefit ratio must be considered carefully. She is not interested in this currently.  3) Risk reducing surgery such as prophylactic mastectomy  which may reduce risk by 90-95%. We discussed that this is a relatively radical strategy and is generally reserved for individuals with a known genetic mutation predisposing them to an increased risk (~50% risk) of breast cancer. She does not meet criteria for risk reducing surgery.   4) I discussed the importance of exercise and weight management as part of a risk reducing strategy, since increased BMI is associated with an increased risk of breast cancer. This is especially true in her case, as I expect her risk would decrease as she lost weight.    Plan:  1. Screening mammo Feb 2023 followed by exam  2. Breast MRI Aug 2023, followed by exam  3. Monthly self breast exams      I have  advised the patient to continue monthly breast self examination and to return to see me in months after completion of imaging.  She was also advised to notify us if she develops new breast symptoms.       MARY LOU Parrish    I have spent 50 min in face to face time with the patient and     CC:  No ref. provider found  Miguel Agustin    EMR Dragon/transcription disclaimer:  Dictated using Dragon dictation

## 2022-11-07 ENCOUNTER — OFFICE VISIT (OUTPATIENT)
Dept: SURGERY | Facility: CLINIC | Age: 42
End: 2022-11-07

## 2022-11-07 VITALS
RESPIRATION RATE: 18 BRPM | SYSTOLIC BLOOD PRESSURE: 138 MMHG | HEIGHT: 67 IN | WEIGHT: 251 LBS | OXYGEN SATURATION: 99 % | BODY MASS INDEX: 39.39 KG/M2 | HEART RATE: 85 BPM | DIASTOLIC BLOOD PRESSURE: 86 MMHG

## 2022-11-07 DIAGNOSIS — Z91.89 AT HIGH RISK FOR BREAST CANCER: Primary | ICD-10-CM

## 2022-11-07 DIAGNOSIS — Z80.3 FAMILY HISTORY OF BREAST CANCER: ICD-10-CM

## 2022-11-07 DIAGNOSIS — Z12.31 ENCOUNTER FOR SCREENING MAMMOGRAM FOR MALIGNANT NEOPLASM OF BREAST: ICD-10-CM

## 2022-11-07 DIAGNOSIS — D36.9 PAPILLOMA: ICD-10-CM

## 2022-11-07 PROCEDURE — 99214 OFFICE O/P EST MOD 30 MIN: CPT | Performed by: NURSE PRACTITIONER

## 2023-02-06 ENCOUNTER — HOSPITAL ENCOUNTER (OUTPATIENT)
Dept: MAMMOGRAPHY | Facility: HOSPITAL | Age: 43
Discharge: HOME | End: 2023-02-06
Admitting: NURSE PRACTITIONER
Payer: COMMERCIAL

## 2023-02-06 DIAGNOSIS — Z12.31 ENCOUNTER FOR SCREENING MAMMOGRAM FOR MALIGNANT NEOPLASM OF BREAST: ICD-10-CM

## 2023-02-06 PROCEDURE — 77063 BREAST TOMOSYNTHESIS BI: CPT

## 2023-02-06 PROCEDURE — 77067 SCR MAMMO BI INCL CAD: CPT

## 2023-02-13 NOTE — PROGRESS NOTES
[facility]     Referring Provider: No ref. provider found     Chief complaint: papilloma and high risk follow up      2022  HPI: Ms. Marianne Sawyer is a 43 yo woman, seen at the request of No ref. provider found, for papilloma    I personally reviewed her records and summarized her relevant breast history/imagin2020 screening mammogram at [facility]  FINDINGS:  The breasts are almost entirely fatty.  There is a biopsy clip in the slightly upper outer middle to posterior  left breast. There are 2 biopsy clips in the upper outer right breast.  There are no suspicious masses, calcifications, or areas of  architectural distortion.  IMPRESSION/RECOMMENDATION(S):  No mammographic evidence of malignancy. Recommend annual screening  mammogram in one year.  BI-RADS Category 2    2021 MRI of breast at [facility]  FINDINGS: There is scattered fibroglandular tissue. There is mild  background parenchymal enhancement.  RIGHT BREAST:    No suspicious enhancing mass or area of non-mass enhancement is  identified.  The visualized axilla is within normal limits.   LEFT BREAST:    No suspicious enhancing mass or area of non-mass enhancement is  identified.  The visualized axilla is within normal limits.   EXTRAMAMMARY FINDINGS:   There are no abnormally enlarged internal mammary chain lymph nodes on  either side.     There is a tiny hiatal hernia.  IMPRESSION AND RECOMMENDATION: No MRI evidence of malignancy in either  breast. Recommend annual screening mammogram in 2021. Consider  screening breast MRI in one year.  BI-RADS Category 1:    2022 screening mammogram at [facility]  FINDINGS:  The breasts are almost entirely fatty.  There are no suspicious masses, calcifications, or areas of  architectural distortion.  IMPRESSION/RECOMMENDATION(S):  No mammographic evidence of malignancy. Recommend annual screening  mammogram in one year.  BI-RADS  Category 1    8/2/2022 breast MRI at [facility]  FINDINGS: The breasts are largely composed of fatty soft tissue. Mild  background parenchymal enhancement of both breasts is noted. Within the  axillary tail region of the right breast there is a skin-based 1.2 x 1.6  x 1.2 cm area of focal skin thickening and increased T2 signal with  enhancement. The lesion appears to be centered within the skin and/or  superficial aspect of the axillary tail region of the right breast.  In the posterior one third of the lower inner quadrant of the right  breast at the 5 o'clock position on the order of 14 cm from nipple there  is an area of irregular linear enhancement that measures on the order of  3.3 cm in anterior to posterior dimension, 1.1 cm in the medial to  lateral dimension and 1.0 cm in the superior to inferior dimension.  There is a possible mammographic correlate on the prior mammogram of  02/02/2022.  Otherwise, there are no areas of abnormal enhancement or morphology in  the right breast. I see no evidence for abnormal right breast skin,  nipple or chest wall enhancement and there is no evidence for right  axillary or internal mammary chain adenopathy.  There are no areas of abnormal enhancement or morphology in the left  breast. I see no evidence for abnormal left breast skin, nipple or chest  wall enhancement and there is no evidence for left axillary or internal  mammary chain adenopathy.  IMPRESSION:  1. There is irregular linear enhancement in the posterior one third  lower inner quadrant of the right breast near the 5 o'clock position on  the order of 14 cm from nipple. A possible mammographic correlate is  appreciated on the mammogram of 02/02/2022. Correlation with diagnostic  right breast mammography and targeted right breast sonography are  recommended. If a sonographic and/or mammographic correlate is  appreciated, then correlation with a Tomosynthesis guided and/or  ultrasound-guided right  breast biopsy is indicated. If no correlate is  seen mammographically or sonographically, then correlation with an MR  guided right breast biopsy is recommended.  2. There is a subcutaneous area of focal enhancement in the axillary  tail region of the right breast which may be associated with the skin  lesion. Further evaluation with targeted right breast sonography and a  clinical examination of the region is recommended.  3. There are no findings suspicious for malignancy in the left breast.  BI-RADS category 4:    8/23/2022 diagnostic right mammogram and right limited ultrasound at [facility]  FINDINGS: Unilateral right digital CC, MLO, exaggerated CC medial and  spot compression 90 degree lateral mammographic and Tomosynthesis images  of the right breast were obtained. Comparison is made to prior  mammography dated 02/02/2022, 12/22/2020 and 10/28/2019. The parenchyma  of the right breast is largely fatty-replaced. Within the posterior  one-third medial aspect of the right breast there is a partially  visualized linear density. Only 1.3 cm of the density can be appreciated  mammographically on the exaggerated CC medial images, but this is  representative of the area of imaging interest seen on MRI. No  suspicious calcifications or new or dominant masses are visualized.  ULTRASOUND: Targeted sonographic evaluation of the lower inner quadrant  of the right breast was performed.  At the 4:30 position on the order of 14 cm from the nipple there is an  irregular heterogenous echogenic and hypoechoic region that measures on  the order of 3.0 x 1.3 x 0.8 cm. Mild peripheral vascularity is noted.  This corresponds to the area of suspicious enhancement seen on the  patient's breast MRI examination.  IMPRESSION:  There is a 3 cm area of ill-defined heterogenous echogenicity in the  right breast at the 4:30 position on the order of 14 cm from the nipple.  This corresponds to the MRI visualized lesion in the  right breast.  Correlation with an ultrasound-guided right breast biopsy is  recommended.  BI-RADS category 4:    9/30/2022 ultrasound-guided breast biopsy and diagnostic right mammogram at [facility]  Postbiopsy unilateral right digital CC and 90 degrees lateral images  were obtained and demonstrate placement of a barbell shaped metallic  clip in the posterior one third lower inner quadrant of the right breast  site of recent biopsy.  The pathology result has returned as clustered microcysts with focal  micropapilloma and duct ectasia. This is concordant with imaging  findings.  IMPRESSION:  Technically successful ultrasound guided Mammotome vacuum assisted right  breast biopsy with placement of a metallic clip. The pathology result  has returned as benign. Routine follow-up breast MRI without and with  contrast and mammography are recommended.  Final Diagnosis   1. Right Breast, 4:30, 14 cm from Nipple, Ultrasound-Guided Biopsies:               A. 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               B. No atypical hyperplasia, in-situ nor invasive carcinoma identified.     2/6/2023 marcus screening mammo at [facility]  FINDINGS:  The breasts are almost entirely fatty.  There are biopsy clips in both breasts. There are no suspicious masses,  calcifications, or areas of architectural distortion.  IMPRESSION/RECOMMENDATION(S):  No mammographic evidence of malignancy. Recommend annual screening  mammogram in one year.  BI-RADS Category 2      She has had one prior breast biopsy at age 32 for left breast calcifications March 4, 2013 stereotactic biopsy of 2 jobs Marker placed in good position.  Cores returned as fibrocystic change with calcifications.     November 5, 2018 right breast ultrasound-guided biopsy 10:00, 3 cm from the nipple.  11-gauge mammotome, multiple tissue specimens.  Pathology result returned as fat necrosis which is concordant.    November 1, 2019  ultrasound-guided right breast biopsy and placement with clip by Dr. David: 9:00, 8 cm from the nipple 11-gauge multiple specimens were taken.  Pathology returned as fat necrosis which is concordant.  Postbiopsy mammogram shows adequate sampling.     Patient saw Dr Soriano  and decided not to start aromasin    She has her uterus and ovaries removed for fibroids and bleeding, is postmenopausal, and has taken estradiol for the past 4 years since her 2012 hysterectomy.  Her family history includes the following:      She has one daughter, one son, 2 sisters, one maternal aunt, 4 paternal aunts.  Her maternal grandmother had breast cancer at age 70, her mother had breast cancer in her 50s.  One sister had breast cancer at age 47.  The other sister had breast cancer age 48.  One of her sisters is a patient of ours named Natalia Patricio.    Today she presents with concerns regarding biopsy and large family history of breast cancer, high risk  She denies any breast lumps, pain, skin changes, or nipple discharge.    She was joined today in clinic by her .  They all participated in the discussion, after her examination, and she gave her consent for them to participate.   She gave consent for her  to be present during her examination and participate in the discussion.   She was by herself in clinic today.       2/14/2023 Interval History  Pt presenting to the office today for routine follow up.  No new breast concerns. Normal imaging 2/6/2023    Review of Systems - Oncology    Medications:  No current outpatient medications on file.    Allergies:  Allergies   Allergen Reactions   • Cleocin [Clindamycin Hcl] Anaphylaxis   • Ceclor [Cefaclor] Unknown (See Comments)     unknown   • Sulfa Antibiotics Rash       Medical history:  Past Medical History:   Diagnosis Date   • Depression    • Factor 5 Leiden mutation, heterozygous (HCC)    • H/O Gestational diabetes    • History of foreign travel 03/2018     [facility], [place]; [place]   • Lump of breast, right 2018   • Mastodynia    • Mood disorder (HCC)    • Tattoos        Surgical History:  Past Surgical History:   Procedure Laterality Date   • BREAST BIOPSY Left 2013    benign    •  SECTION  2003, 2007    HELLP syndrome   • DILATATION AND CURETTAGE     • LASER ABLATION      eye-bilateral   • LASIK     • SKIN LESION EXCISION      benign-back   • TIBIAL PLATEAU HARDWARE REMOVAL Right    • TOTAL ABDOMINAL HYSTERECTOMY WITH SALPINGO OOPHORECTOMY  2012    fibroids and bleeding       Family History:  Family History   Problem Relation Age of Onset   • Asthma Mother    • COPD Mother    • Deep vein thrombosis Mother    • Depression Mother    • Breast cancer Mother         40s   • Hypertension Mother    • Pulmonary embolism Mother    • Clotting disorder Father         Factor V   • Deep vein thrombosis Father    • Hypertension Father    • Hyperlipidemia Father    • Diabetes Father    • Heart disease Father    • Clotting disorder Sister         Factor V   • Depression Sister    • Breast cancer Sister 47   • Thyroid cancer Sister    • Breast cancer Sister 48   • Breast cancer Maternal Grandmother         70s   • Lung cancer Paternal Grandmother    • Breast cancer Paternal Aunt         60-70s   • Esophageal cancer Maternal Grandfather         90s       Social History:   Social History     Socioeconomic History   • Marital status:      Spouse name: Gus   • Number of children: 2   • Years of education: College   Tobacco Use   • Smoking status: Never   • Smokeless tobacco: Never   Substance and Sexual Activity   • Alcohol use: Yes     Comment: beer occasionally   • Drug use: No   • Sexual activity: Defer     Patient drinks 2 servings of caffeine per day.       GYNECOLOGIC HISTORY:   . P: 2. AB: 0.  Last menstrual period:   Age at menarche: 12  Age at first childbirth: 23  Lactation/How long: na  Age at menopause: 32  Total years of oral  contraceptive use: 20  Total years of hormone replacement therapy: na      Physical Exam  Vitals:    02/14/23 1043   BP: 138/88   Pulse: 110   SpO2: 98%     ECOG 0 - Asymptomatic  General: NAD, well appearing  Psych: a&o x 3, normal mood and affect  Eyes: EOMI, no scleral icterus  ENMT: neck supple without masses or thyromegaly, mucus membranes moist  Resp: normal effort, CTAB  CV: RRR, no murmurs, no edema   GI: soft, NT, ND  MSK: normal gait, normal ROM in bilateral shoulders  Lymph nodes:  no cervical, supraclavicular or axillary lymphadenopathy  Breast: symmetric, very large  Right:  No visible abnormalities on inspection while seated, with arms raised or hands on hips. No masses, skin changes, or nipple abnormalities. Nipple inverted  Left:  No visible abnormalities on inspection while seated, with arms raised or hands on hips. No masses, skin changes, or nipple abnormalities. Nipple inverted       Assessment:    1)  42 y.o. F with a new diagnosis of micropapilloma  2) family history of breast cancer  3) high risk for breast cancer        Discussion:  1. Papilloma was discussed with pt.  Benign process and no excisional surgeon required at this time.  Papilloma was small in size.  2. We discussed management options for individuals who are at increased risk (>20% lifetime risk):  1) High risk screening - Annual mammogram and annual breast MRI, alternating one test every 6 months, biannual clinical exam and monthly self breast exam. She meets criteria for high risk screening.  2) Chemoprevention with Tamoxifen, Raloxifene or Exemestane. These may reduce risk up to 50%. I reviewed that these particular medications are not without risks and the risk/benefit ratio must be considered carefully. She is not interested in this currently.  3) Risk reducing surgery such as prophylactic mastectomy  which may reduce risk by 90-95%. We discussed that this is a relatively radical strategy and is generally reserved for  individuals with a known genetic mutation predisposing them to an increased risk (~50% risk) of breast cancer. She does not meet criteria for risk reducing surgery.   4) I discussed the importance of exercise and weight management as part of a risk reducing strategy, since increased BMI is associated with an increased risk of breast cancer. This is especially true in her case, as I expect her risk would decrease as she lost weight.    Plan:    1. Breast MRI Aug 2023, followed by exam  2. Monthly self breast exams      I have advised the patient to continue monthly breast self examination and to return to see me in months after completion of imaging.  She was also advised to notify us if she develops new breast symptoms.       MARY LOU Parrish    I have spent 25 min in face to face time with the patient and     CC:  No ref. provider found  Miguel Agustin/transcription disclaimer:  Dictated using Dragon dictation

## 2023-02-14 ENCOUNTER — OFFICE VISIT (OUTPATIENT)
Dept: SURGERY | Facility: CLINIC | Age: 43
End: 2023-02-14
Payer: COMMERCIAL

## 2023-02-14 VITALS
BODY MASS INDEX: 39.08 KG/M2 | HEIGHT: 67 IN | WEIGHT: 249 LBS | OXYGEN SATURATION: 98 % | DIASTOLIC BLOOD PRESSURE: 88 MMHG | HEART RATE: 110 BPM | SYSTOLIC BLOOD PRESSURE: 138 MMHG

## 2023-02-14 DIAGNOSIS — Z80.3 FAMILY HISTORY OF BREAST CANCER: ICD-10-CM

## 2023-02-14 DIAGNOSIS — Z91.89 AT HIGH RISK FOR BREAST CANCER: ICD-10-CM

## 2023-02-14 DIAGNOSIS — D36.9 PAPILLOMA: Primary | ICD-10-CM

## 2023-02-14 PROCEDURE — 99213 OFFICE O/P EST LOW 20 MIN: CPT | Performed by: NURSE PRACTITIONER

## 2023-08-07 ENCOUNTER — HOSPITAL ENCOUNTER (OUTPATIENT)
Dept: MRI IMAGING | Facility: HOSPITAL | Age: 43
Discharge: HOME | End: 2023-08-07
Admitting: NURSE PRACTITIONER
Payer: COMMERCIAL

## 2023-08-07 DIAGNOSIS — Z91.89 AT HIGH RISK FOR BREAST CANCER: ICD-10-CM

## 2023-08-07 DIAGNOSIS — Z80.3 FAMILY HISTORY OF BREAST CANCER: ICD-10-CM

## 2023-08-07 PROCEDURE — A9577 INJ MULTIHANCE: HCPCS | Performed by: NURSE PRACTITIONER

## 2023-08-07 PROCEDURE — 77049 MRI BREAST C-+ W/CAD BI: CPT

## 2023-08-07 PROCEDURE — 0 GADOBENATE DIMEGLUMINE 529 MG/ML SOLUTION: Performed by: NURSE PRACTITIONER

## 2023-08-07 RX ADMIN — GADOBENATE DIMEGLUMINE 20 ML: 529 INJECTION, SOLUTION INTRAVENOUS at 10:07

## 2023-08-11 NOTE — PROGRESS NOTES
[facility]     Referring Provider: Francis Butterfield MD     Chief complaint:  papilloma  and high risk follow up      2022  HPI: Ms. Marianne Sawyer is a 41 yo woman, seen at the request of Francis Butterfield MD, for papilloma    I personally reviewed her records and summarized her relevant breast history/imagin2020 screening mammogram at [facility]  FINDINGS:  The breasts are almost entirely fatty.  There is a biopsy clip in the slightly upper outer middle to posterior  left breast. There are 2 biopsy clips in the upper outer right breast.  There are no suspicious masses, calcifications, or areas of  architectural distortion.  IMPRESSION/RECOMMENDATION(S):  No mammographic evidence of malignancy. Recommend annual screening  mammogram in one year.  BI-RADS Category 2    2021 MRI of breast at [facility]  FINDINGS: There is scattered fibroglandular tissue. There is mild  background parenchymal enhancement.  RIGHT BREAST:    No suspicious enhancing mass or area of non-mass enhancement is  identified.  The visualized axilla is within normal limits.   LEFT BREAST:    No suspicious enhancing mass or area of non-mass enhancement is  identified.  The visualized axilla is within normal limits.   EXTRAMAMMARY FINDINGS:   There are no abnormally enlarged internal mammary chain lymph nodes on  either side.     There is a tiny hiatal hernia.  IMPRESSION AND RECOMMENDATION: No MRI evidence of malignancy in either  breast. Recommend annual screening mammogram in 2021. Consider  screening breast MRI in one year.  BI-RADS Category 1:    2022 screening mammogram at [facility]  FINDINGS:  The breasts are almost entirely fatty.  There are no suspicious masses, calcifications, or areas of  architectural distortion.  IMPRESSION/RECOMMENDATION(S):  No mammographic evidence of malignancy. Recommend annual screening  mammogram in one year.  BI-RADS Category  1    8/2/2022 breast MRI at [facility]  FINDINGS: The breasts are largely composed of fatty soft tissue. Mild  background parenchymal enhancement of both breasts is noted. Within the  axillary tail region of the right breast there is a skin-based 1.2 x 1.6  x 1.2 cm area of focal skin thickening and increased T2 signal with  enhancement. The lesion appears to be centered within the skin and/or  superficial aspect of the axillary tail region of the right breast.  In the posterior one third of the lower inner quadrant of the right  breast at the 5 o'clock position on the order of 14 cm from nipple there  is an area of irregular linear enhancement that measures on the order of  3.3 cm in anterior to posterior dimension, 1.1 cm in the medial to  lateral dimension and 1.0 cm in the superior to inferior dimension.  There is a possible mammographic correlate on the prior mammogram of  02/02/2022.  Otherwise, there are no areas of abnormal enhancement or morphology in  the right breast. I see no evidence for abnormal right breast skin,  nipple or chest wall enhancement and there is no evidence for right  axillary or internal mammary chain adenopathy.  There are no areas of abnormal enhancement or morphology in the left  breast. I see no evidence for abnormal left breast skin, nipple or chest  wall enhancement and there is no evidence for left axillary or internal  mammary chain adenopathy.  IMPRESSION:  1. There is irregular linear enhancement in the posterior one third  lower inner quadrant of the right breast near the 5 o'clock position on  the order of 14 cm from nipple. A possible mammographic correlate is  appreciated on the mammogram of 02/02/2022. Correlation with diagnostic  right breast mammography and targeted right breast sonography are  recommended. If a sonographic and/or mammographic correlate is  appreciated, then correlation with a Tomosynthesis guided and/or  ultrasound-guided right breast  biopsy is indicated. If no correlate is  seen mammographically or sonographically, then correlation with an MR  guided right breast biopsy is recommended.  2. There is a subcutaneous area of focal enhancement in the axillary  tail region of the right breast which may be associated with the skin  lesion. Further evaluation with targeted right breast sonography and a  clinical examination of the region is recommended.  3. There are no findings suspicious for malignancy in the left breast.  BI-RADS category 4:    8/23/2022 diagnostic right mammogram and right limited ultrasound at [facility]  FINDINGS: Unilateral right digital CC, MLO, exaggerated CC medial and  spot compression 90 degree lateral mammographic and Tomosynthesis images  of the right breast were obtained. Comparison is made to prior  mammography dated 02/02/2022, 12/22/2020 and 10/28/2019. The parenchyma  of the right breast is largely fatty-replaced. Within the posterior  one-third medial aspect of the right breast there is a partially  visualized linear density. Only 1.3 cm of the density can be appreciated  mammographically on the exaggerated CC medial images, but this is  representative of the area of imaging interest seen on MRI. No  suspicious calcifications or new or dominant masses are visualized.  ULTRASOUND: Targeted sonographic evaluation of the lower inner quadrant  of the right breast was performed.  At the 4:30 position on the order of 14 cm from the nipple there is an  irregular heterogenous echogenic and hypoechoic region that measures on  the order of 3.0 x 1.3 x 0.8 cm. Mild peripheral vascularity is noted.  This corresponds to the area of suspicious enhancement seen on the  patient's breast MRI examination.  IMPRESSION:  There is a 3 cm area of ill-defined heterogenous echogenicity in the  right breast at the 4:30 position on the order of 14 cm from the nipple.  This corresponds to the MRI visualized lesion in the right  breast.  Correlation with an ultrasound-guided right breast biopsy is  recommended.  BI-RADS category 4:    9/30/2022 [facility]  Postbiopsy unilateral right digital CC and 90 degrees lateral images  were obtained and demonstrate placement of a barbell shaped metallic  clip in the posterior one third lower inner quadrant of the right breast  site of recent biopsy.  The pathology result has returned as clustered microcysts with focal  micropapilloma and duct ectasia. This is concordant with imaging  findings.  IMPRESSION:  Technically successful ultrasound guided Mammotome vacuum assisted right  breast biopsy with placement of a metallic clip. The pathology result  has returned as benign. Routine follow-up breast MRI without and with  contrast and mammography are recommended.  Final Diagnosis   1. Right Breast, 4:30, 14 cm from Nipple, Ultrasound-Guided Biopsies:               A. 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               B. No atypical hyperplasia, in-situ nor invasive carcinoma identified.     2/6/2023 marcus screening mammo at [facility]  FINDINGS:  The breasts are almost entirely fatty.  There are biopsy clips in both breasts. There are no suspicious masses,  calcifications, or areas of architectural distortion.  IMPRESSION/RECOMMENDATION(S):  No mammographic evidence of malignancy. Recommend annual screening  mammogram in one year.  BI-RADS Category 2      She has had one prior breast biopsy at age 32 for left breast calcifications March 4, 2013 stereotactic biopsy of 2 jobs Marker placed in good position.  Cores returned as fibrocystic change with calcifications.     November 5, 2018 right breast ultrasound-guided biopsy 10:00, 3 cm from the nipple.  11-gauge mammotome, multiple tissue specimens.  Pathology result returned as fat necrosis which is concordant.    November 1, 2019 ultrasound-guided  right breast biopsy and placement with clip by Dr. David: 9:00, 8 cm from the nipple 11-gauge multiple specimens were taken.  Pathology returned as fat necrosis which is concordant.  Postbiopsy mammogram shows adequate sampling.     Patient saw Dr Soriano  and decided not to start aromasin    She has her uterus and ovaries removed for fibroids and bleeding, is postmenopausal, and has taken estradiol for the past 4 years since her 2012 hysterectomy.  Her family history includes the following:      She has one daughter, one son, 2 sisters, one maternal aunt, 4 paternal aunts.  Her maternal grandmother had breast cancer at age 70, her mother had breast cancer in her 50s.  One sister had breast cancer at age 47.  The other sister had breast cancer age 48.  One of her sisters is a patient of ours named Natalia Patricio.    Today she presents with concerns regarding biopsy and large family history of breast cancer, high risk  She denies any breast lumps, pain, skin changes, or nipple discharge.    She was joined today in clinic by her .  They all participated in the discussion, after her examination, and she gave her consent for them to participate.   She gave consent for her  to be present during her examination and participate in the discussion.   She was by herself in clinic today.       2/14/2023   Pt presenting to the office today for routine follow up.  No new breast concerns. Normal imaging 2/6/2023 8/14/2023 interval history  Patient returns the office today for routine follow-up.  She has no new breast complaints today.  Had a breast MRI on 8/7/2023 which resulted as BI-RADS 1.    Review of Systems - Oncology    Medications:  No current outpatient medications on file.    Allergies:  Allergies   Allergen Reactions    Cleocin [Clindamycin Hcl] Anaphylaxis    Ceclor [Cefaclor] Unknown (See Comments)     unknown    Sulfa Antibiotics Rash       Medical history:  Past Medical History:    Diagnosis Date    Depression     Factor 5 Leiden mutation, heterozygous     H/O Gestational diabetes     History of foreign travel 2018    [facility], [place]; [place]    Lump of breast, right 2018    Mastodynia     Mood disorder     Tattoos        Surgical History:  Past Surgical History:   Procedure Laterality Date    BREAST BIOPSY Left 2013    benign      SECTION  2003, 2007    HELLP syndrome    DILATATION AND CURETTAGE      LASER ABLATION      eye-bilateral    LASIK      SKIN LESION EXCISION      benign-back    TIBIAL PLATEAU HARDWARE REMOVAL Right     TOTAL ABDOMINAL HYSTERECTOMY WITH SALPINGO OOPHORECTOMY  2012    fibroids and bleeding       Family History:  Family History   Problem Relation Age of Onset    Asthma Mother     COPD Mother     Deep vein thrombosis Mother     Depression Mother     Breast cancer Mother         40s    Hypertension Mother     Pulmonary embolism Mother     Clotting disorder Father         Factor V    Deep vein thrombosis Father     Hypertension Father     Hyperlipidemia Father     Diabetes Father     Heart disease Father     Clotting disorder Sister         Factor V    Depression Sister     Breast cancer Sister 47    Thyroid cancer Sister     Breast cancer Sister 48    Breast cancer Maternal Grandmother         70s    Lung cancer Paternal Grandmother     Breast cancer Paternal Aunt         60-70s    Esophageal cancer Maternal Grandfather         90s       Social History:   Social History     Socioeconomic History    Marital status:      Spouse name: Gus    Number of children: 2    Years of education: College   Tobacco Use    Smoking status: Never    Smokeless tobacco: Never   Substance and Sexual Activity    Alcohol use: Yes     Comment: beer occasionally    Drug use: No    Sexual activity: Defer     Patient drinks 2 servings of caffeine per day.       GYNECOLOGIC HISTORY:   . P: 2. AB: 0.  Last menstrual period:   Age at menarche:  12  Age at first childbirth: 23  Lactation/How long: na  Age at menopause: 32  Total years of oral contraceptive use: 20  Total years of hormone replacement therapy: na      Physical Exam  Vitals:    23 1022   BP: 130/100     ECOG 0 - Asymptomatic  General: NAD, well appearing  Psych: a&o x 3, normal mood and affect  Eyes: EOMI, no scleral icterus  ENMT: neck supple without masses or thyromegaly, mucus membranes moist  Resp: normal effort, CTAB  CV: RRR, no murmurs, no edema   GI: soft, NT, ND  MSK: normal gait, normal ROM in bilateral shoulders  Lymph nodes:  no cervical, supraclavicular or axillary lymphadenopathy  Breast: symmetric, very large  Right:  No visible abnormalities on inspection while seated, with arms raised or hands on hips. No masses, skin changes, or nipple abnormalities. Nipple inverted  Left:  No visible abnormalities on inspection while seated, with arms raised or hands on hips. No masses, skin changes, or nipple abnormalities. Nipple inverted     Imagin2023 breast MRI at [facility]  FINDINGS: The breasts are largely composed of fatty soft tissue. Mild  background parenchymal enhancement of both breasts is noted.  There has been interval resolution of a previously described skin lesion  in the axillary tail region of the right breast. Additionally, there has  been interval considerable decrease in enhancement of the previously  noted area of irregular linear enhancement in the posterior one-third of  the right breast at the 5 o'clock position. At this location there is a  focal signal void which corresponds to a biopsy clip at the previously  biopsy site which returned as benign.  No areas of suspicious enhancement or morphology are seen in the right  breast. I see no evidence for abnormal right breast skin, nipple or  chest wall enhancement and there is no evidence for right axillary or  internal mammary chain adenopathy.  There are no areas of suspicious enhancement or morphology in  the left  breast. I see no evidence for abnormal left breast skin, nipple or chest  wall enhancement and there is no evidence for left axillary or internal  mammary chain adenopathy.  IMPRESSION:  There are no findings suspicious for malignancy in either  breast. Correlation with routine mammography is recommended.  BI-RADS CATEGORY 1: Negative.    Assessment:    1)  43 y.o. F with a new diagnosis of micropapilloma  2) family history of breast cancer  3) high risk for breast cancer        Discussion:  1. Papilloma was discussed with pt.  Benign process and no excisional surgeon required at this time.  Papilloma was small in size.  2. We discussed management options for individuals who are at increased risk (>20% lifetime risk):  1) High risk screening - Annual mammogram and annual breast MRI, alternating one test every 6 months, biannual clinical exam and monthly self breast exam. She meets criteria for high risk screening.  2) Chemoprevention with Tamoxifen, Raloxifene or Exemestane. These may reduce risk up to 50%. I reviewed that these particular medications are not without risks and the risk/benefit ratio must be considered carefully. She is not interested in this currently.  3) Risk reducing surgery such as prophylactic mastectomy  which may reduce risk by 90-95%. We discussed that this is a relatively radical strategy and is generally reserved for individuals with a known genetic mutation predisposing them to an increased risk (~50% risk) of breast cancer. She does not meet criteria for risk reducing surgery.   4) I discussed the importance of exercise and weight management as part of a risk reducing strategy, since increased BMI is associated with an increased risk of breast cancer. This is especially true in her case, as I expect her risk would decrease as she lost weight.    Plan:    1.  Screening mammogram in February followed by exam  2. Monthly self breast exams      MARY LOU Parrish    I have spent 25  min in face to face time with the patient and     CC:  MD Nikole Felix, Miguelmac Figueroa/transcription disclaimer:  Dictated using Dragon dictation

## 2023-08-14 ENCOUNTER — OFFICE VISIT (OUTPATIENT)
Dept: SURGERY | Facility: CLINIC | Age: 43
End: 2023-08-14
Payer: COMMERCIAL

## 2023-08-14 VITALS
DIASTOLIC BLOOD PRESSURE: 100 MMHG | WEIGHT: 249 LBS | HEIGHT: 67 IN | BODY MASS INDEX: 39.08 KG/M2 | SYSTOLIC BLOOD PRESSURE: 130 MMHG

## 2023-08-14 DIAGNOSIS — Z12.31 ENCOUNTER FOR SCREENING MAMMOGRAM FOR MALIGNANT NEOPLASM OF BREAST: ICD-10-CM

## 2023-08-14 DIAGNOSIS — Z91.89 AT HIGH RISK FOR BREAST CANCER: ICD-10-CM

## 2023-08-14 DIAGNOSIS — Z80.3 FAMILY HISTORY OF BREAST CANCER: Primary | ICD-10-CM

## 2023-08-14 PROCEDURE — 99213 OFFICE O/P EST LOW 20 MIN: CPT | Performed by: NURSE PRACTITIONER

## 2023-08-15 ENCOUNTER — TELEPHONE (OUTPATIENT)
Dept: SURGERY | Facility: CLINIC | Age: 43
End: 2023-08-15
Payer: COMMERCIAL

## 2024-02-07 ENCOUNTER — HOSPITAL ENCOUNTER (OUTPATIENT)
Dept: MAMMOGRAPHY | Facility: HOSPITAL | Age: 44
Discharge: HOME | End: 2024-02-07
Admitting: NURSE PRACTITIONER
Payer: COMMERCIAL

## 2024-02-07 DIAGNOSIS — Z12.31 ENCOUNTER FOR SCREENING MAMMOGRAM FOR MALIGNANT NEOPLASM OF BREAST: ICD-10-CM

## 2024-02-07 PROCEDURE — 77067 SCR MAMMO BI INCL CAD: CPT

## 2024-02-07 PROCEDURE — 77063 BREAST TOMOSYNTHESIS BI: CPT

## 2024-02-15 ENCOUNTER — OFFICE VISIT (OUTPATIENT)
Dept: SURGERY | Facility: CLINIC | Age: 44
End: 2024-02-15
Payer: COMMERCIAL

## 2024-02-15 VITALS
WEIGHT: 263 LBS | SYSTOLIC BLOOD PRESSURE: 138 MMHG | HEIGHT: 67 IN | DIASTOLIC BLOOD PRESSURE: 80 MMHG | HEART RATE: 84 BPM | OXYGEN SATURATION: 96 % | BODY MASS INDEX: 41.28 KG/M2

## 2024-02-15 DIAGNOSIS — D36.9 PAPILLOMA: ICD-10-CM

## 2024-02-15 DIAGNOSIS — Z91.89 AT HIGH RISK FOR BREAST CANCER: Primary | ICD-10-CM

## 2024-02-15 DIAGNOSIS — Z80.3 FAMILY HISTORY OF BREAST CANCER: ICD-10-CM

## 2024-09-12 ENCOUNTER — HOSPITAL ENCOUNTER (OUTPATIENT)
Dept: MRI IMAGING | Facility: HOSPITAL | Age: 44
Discharge: HOME | End: 2024-09-12
Admitting: NURSE PRACTITIONER
Payer: COMMERCIAL

## 2024-09-12 DIAGNOSIS — Z91.89 AT HIGH RISK FOR BREAST CANCER: ICD-10-CM

## 2024-09-12 PROCEDURE — A9577 INJ MULTIHANCE: HCPCS | Performed by: NURSE PRACTITIONER

## 2024-09-12 PROCEDURE — 0 GADOBENATE DIMEGLUMINE 529 MG/ML SOLUTION: Performed by: NURSE PRACTITIONER

## 2024-09-12 PROCEDURE — 77049 MRI BREAST C-+ W/CAD BI: CPT

## 2024-09-12 RX ADMIN — GADOBENATE DIMEGLUMINE 20 ML: 529 INJECTION, SOLUTION INTRAVENOUS at 11:36

## 2024-09-13 ENCOUNTER — TELEPHONE (OUTPATIENT)
Dept: SURGERY | Facility: CLINIC | Age: 44
End: 2024-09-13
Payer: COMMERCIAL

## 2024-09-13 DIAGNOSIS — R92.8 ABNORMAL MAMMOGRAM: Primary | ICD-10-CM

## 2024-09-13 NOTE — TELEPHONE ENCOUNTER
LAZARUS for pt to let her know I shameka a axilla us here at the hospital at [facility] on 09/19 @ 930am I told her we need to get that imaging before her appt with deep zamorano on 10/1  I told her to call me if she had any questions       Sent pt message via [employer]   Good Morning   I am calling because I got your axilla ultrasound scheduled here at the hospital over in entrance A on 09/19 @ 930am we do need that imaging before you see MARY LOU Parrish on 10/1.  If you need to reschedule the number is [phone]    If you need anything or have any questions please let me know   Thanks   Jo DIZA

## 2024-09-13 NOTE — TELEPHONE ENCOUNTER
Called and spoke with Rika regarding the MRI results.  She states that the area on the right is where her underwire came through her bra and caused an abrasion and it is now healing.  On the left breast she gets boil type sores under her arms and on her breast at times and that is what is happening on her left breast but is also in the healing process.  I will follow-up with her at her already set appointment in October but I do need her to have a left axillary ultrasound prior to that visit to check on the lymph node.  Please set this up for her.

## 2024-09-19 ENCOUNTER — HOSPITAL ENCOUNTER (OUTPATIENT)
Dept: ULTRASOUND IMAGING | Facility: HOSPITAL | Age: 44
Discharge: HOME | End: 2024-09-19
Admitting: NURSE PRACTITIONER
Payer: COMMERCIAL

## 2024-09-19 DIAGNOSIS — R92.8 ABNORMAL MAMMOGRAM: ICD-10-CM

## 2024-09-19 PROCEDURE — 76882 US LMTD JT/FCL EVL NVASC XTR: CPT

## 2024-09-30 NOTE — PROGRESS NOTES
[facility]     Referring Provider: Francis Butterfield MD     Chief complaint:  papilloma  and high risk follow up      2022  HPI: Ms. Marianne Sawyer is a 43 yo woman, seen at the request of Francis Butterfield MD, for papilloma    I personally reviewed her records and summarized her relevant breast history/imagin2020 screening mammogram at [facility]  FINDINGS:  The breasts are almost entirely fatty.  There is a biopsy clip in the slightly upper outer middle to posterior  left breast. There are 2 biopsy clips in the upper outer right breast.  There are no suspicious masses, calcifications, or areas of  architectural distortion.  IMPRESSION/RECOMMENDATION(S):  No mammographic evidence of malignancy. Recommend annual screening  mammogram in one year.  BI-RADS Category 2    2021 MRI of breast at [facility]  FINDINGS: There is scattered fibroglandular tissue. There is mild  background parenchymal enhancement.  RIGHT BREAST:    No suspicious enhancing mass or area of non-mass enhancement is  identified.  The visualized axilla is within normal limits.   LEFT BREAST:    No suspicious enhancing mass or area of non-mass enhancement is  identified.  The visualized axilla is within normal limits.   EXTRAMAMMARY FINDINGS:   There are no abnormally enlarged internal mammary chain lymph nodes on  either side.     There is a tiny hiatal hernia.  IMPRESSION AND RECOMMENDATION: No MRI evidence of malignancy in either  breast. Recommend annual screening mammogram in 2021. Consider  screening breast MRI in one year.  BI-RADS Category 1:    2022 screening mammogram at [facility]  FINDINGS:  The breasts are almost entirely fatty.  There are no suspicious masses, calcifications, or areas of  architectural distortion.  IMPRESSION/RECOMMENDATION(S):  No mammographic evidence of malignancy. Recommend annual screening  mammogram in one year.  BI-RADS Category  1    8/2/2022 breast MRI at [facility]  FINDINGS: The breasts are largely composed of fatty soft tissue. Mild  background parenchymal enhancement of both breasts is noted. Within the  axillary tail region of the right breast there is a skin-based 1.2 x 1.6  x 1.2 cm area of focal skin thickening and increased T2 signal with  enhancement. The lesion appears to be centered within the skin and/or  superficial aspect of the axillary tail region of the right breast.  In the posterior one third of the lower inner quadrant of the right  breast at the 5 o'clock position on the order of 14 cm from nipple there  is an area of irregular linear enhancement that measures on the order of  3.3 cm in anterior to posterior dimension, 1.1 cm in the medial to  lateral dimension and 1.0 cm in the superior to inferior dimension.  There is a possible mammographic correlate on the prior mammogram of  02/02/2022.  Otherwise, there are no areas of abnormal enhancement or morphology in  the right breast. I see no evidence for abnormal right breast skin,  nipple or chest wall enhancement and there is no evidence for right  axillary or internal mammary chain adenopathy.  There are no areas of abnormal enhancement or morphology in the left  breast. I see no evidence for abnormal left breast skin, nipple or chest  wall enhancement and there is no evidence for left axillary or internal  mammary chain adenopathy.  IMPRESSION:  1. There is irregular linear enhancement in the posterior one third  lower inner quadrant of the right breast near the 5 o'clock position on  the order of 14 cm from nipple. A possible mammographic correlate is  appreciated on the mammogram of 02/02/2022. Correlation with diagnostic  right breast mammography and targeted right breast sonography are  recommended. If a sonographic and/or mammographic correlate is  appreciated, then correlation with a Tomosynthesis guided and/or  ultrasound-guided right breast  biopsy is indicated. If no correlate is  seen mammographically or sonographically, then correlation with an MR  guided right breast biopsy is recommended.  2. There is a subcutaneous area of focal enhancement in the axillary  tail region of the right breast which may be associated with the skin  lesion. Further evaluation with targeted right breast sonography and a  clinical examination of the region is recommended.  3. There are no findings suspicious for malignancy in the left breast.  BI-RADS category 4:    8/23/2022 diagnostic right mammogram and right limited ultrasound at [facility]  FINDINGS: Unilateral right digital CC, MLO, exaggerated CC medial and  spot compression 90 degree lateral mammographic and Tomosynthesis images  of the right breast were obtained. Comparison is made to prior  mammography dated 02/02/2022, 12/22/2020 and 10/28/2019. The parenchyma  of the right breast is largely fatty-replaced. Within the posterior  one-third medial aspect of the right breast there is a partially  visualized linear density. Only 1.3 cm of the density can be appreciated  mammographically on the exaggerated CC medial images, but this is  representative of the area of imaging interest seen on MRI. No  suspicious calcifications or new or dominant masses are visualized.  ULTRASOUND: Targeted sonographic evaluation of the lower inner quadrant  of the right breast was performed.  At the 4:30 position on the order of 14 cm from the nipple there is an  irregular heterogenous echogenic and hypoechoic region that measures on  the order of 3.0 x 1.3 x 0.8 cm. Mild peripheral vascularity is noted.  This corresponds to the area of suspicious enhancement seen on the  patient's breast MRI examination.  IMPRESSION:  There is a 3 cm area of ill-defined heterogenous echogenicity in the  right breast at the 4:30 position on the order of 14 cm from the nipple.  This corresponds to the MRI visualized lesion in the right  breast.  Correlation with an ultrasound-guided right breast biopsy is  recommended.  BI-RADS category 4:    9/30/2022 ultrasound-guided breast biopsy and diagnostic right mammogram at [facility]  Postbiopsy unilateral right digital CC and 90 degrees lateral images  were obtained and demonstrate placement of a barbell shaped metallic  clip in the posterior one third lower inner quadrant of the right breast  site of recent biopsy.  The pathology result has returned as clustered microcysts with focal  micropapilloma and duct ectasia. This is concordant with imaging  findings.  IMPRESSION:  Technically successful ultrasound guided Mammotome vacuum assisted right  breast biopsy with placement of a metallic clip. The pathology result  has returned as benign. Routine follow-up breast MRI without and with  contrast and mammography are recommended.  Final Diagnosis   1. Right Breast, 4:30, 14 cm from Nipple, Ultrasound-Guided Biopsies:               A. 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               B. No atypical hyperplasia, in-situ nor invasive carcinoma identified.     2/6/2023 marcus screening mammo at [facility]  FINDINGS:  The breasts are almost entirely fatty.  There are biopsy clips in both breasts. There are no suspicious masses,  calcifications, or areas of architectural distortion.  IMPRESSION/RECOMMENDATION(S):  No mammographic evidence of malignancy. Recommend annual screening  mammogram in one year.  BI-RADS Category 2      She has had one prior breast biopsy at age 32 for left breast calcifications March 4, 2013 stereotactic biopsy of 2 jobs Marker placed in good position.  Cores returned as fibrocystic change with calcifications.     November 5, 2018 right breast ultrasound-guided biopsy 10:00, 3 cm from the nipple.  11-gauge mammotome, multiple tissue specimens.  Pathology result returned as fat necrosis which is concordant.    November 1, 2019 ultrasound-guided  right breast biopsy and placement with clip by Dr. David: 9:00, 8 cm from the nipple 11-gauge multiple specimens were taken.  Pathology returned as fat necrosis which is concordant.  Postbiopsy mammogram shows adequate sampling.     Patient saw Dr Soriano  and decided not to start aromasin    She has her uterus and ovaries removed for fibroids and bleeding, is postmenopausal, and has taken estradiol for the past 4 years since her 2012 hysterectomy.  Her family history includes the following:      She has one daughter, one son, 2 sisters, one maternal aunt, 4 paternal aunts.  Her maternal grandmother had breast cancer at age 70, her mother had breast cancer in her 50s.  One sister had breast cancer at age 47.  The other sister had breast cancer age 48.  One of her sisters is a patient of ours named Natalia Patricio.    Today she presents with concerns regarding biopsy and large family history of breast cancer, high risk  She denies any breast lumps, pain, skin changes, or nipple discharge.    She was joined today in clinic by her .  They all participated in the discussion, after her examination, and she gave her consent for them to participate.   She gave consent for her  to be present during her examination and participate in the discussion.   She was by herself in clinic today.       2/14/2023   Pt presenting to the office today for routine follow up.  No new breast concerns. Normal imaging 2/6/2023 8/14/2023   Patient returns the office today for routine follow-up.  She has no new breast complaints today.  Had a breast MRI on 8/7/2023 which resulted as BI-RADS 1.    2/15/2024 interval history  Patient presenting to the office today for routine follow-up.  She has no new breast complaints or concerns today.  Had a bilateral screening mammogram on 2/7/2024 that resulted as BI-RADS 1.    10/1/2024 interval history  Patient presenting to the office today for routine follow-up.  On 9/12/2024 she  had a bilateral breast MRI that resulted as BI-RADS 4 for suspicious 3.8 cm non-mass enhancement at 5-6 o'clock in the anterior left breast with involvement of the overlying skin.  Suggested to correlate with direct inspection and also do a diagnostic mammogram and targeted left breast ultrasound.  She also has a left axillary lymph node is increased in size and demonstrates new cortical thinning.  Recommended targeted left axillary ultrasound and possible core needle biopsy.  She also has a nonspecific 1.4 cm enhancing dermal lesion at 9:00 overlying the posterior right breast.  Correlate with direct inspection.  On 2024 a left axillary ultrasound that resulted as BI-RADS 2.  She states that she off-and-on gets a folliculitis type wounds on her chest and under her arms which she is currently experiencing on the right and she believes that is one of the spots that picked up on the MRI.  On the left breast she also has an open wound but is totally different than the right and she is unsure how it got there but it is healing and looks about 80% better than it did when she first had it      Review of Systems - Oncology    Medications:  No current outpatient medications on file.    Allergies:  Allergies   Allergen Reactions    Cleocin [Clindamycin Hcl] Anaphylaxis    Ceclor [Cefaclor] Unknown (See Comments)     unknown    Sulfa Antibiotics Rash       Medical history:  Past Medical History:   Diagnosis Date    Depression     Factor 5 Leiden mutation, heterozygous     H/O Gestational diabetes     History of foreign travel 2018    [facility], [place]; [place]    Lump of breast, right 2018    Mastodynia     Mood disorder     Tattoos        Surgical History:  Past Surgical History:   Procedure Laterality Date    BREAST BIOPSY Left 2013    benign      SECTION  2003, 2007    HELLP syndrome    DILATATION AND CURETTAGE      LASER ABLATION      eye-bilateral    LASIK      SKIN LESION EXCISION       benign-back    TIBIAL PLATEAU HARDWARE REMOVAL Right     TOTAL ABDOMINAL HYSTERECTOMY WITH SALPINGO OOPHORECTOMY  2012    fibroids and bleeding       Family History:  Family History   Problem Relation Age of Onset    Asthma Mother     COPD Mother     Deep vein thrombosis Mother     Depression Mother     Breast cancer Mother         40s    Hypertension Mother     Pulmonary embolism Mother     Clotting disorder Father         Factor V    Deep vein thrombosis Father     Hypertension Father     Hyperlipidemia Father     Diabetes Father     Heart disease Father     Clotting disorder Sister         Factor V    Depression Sister     Breast cancer Sister 47    Thyroid cancer Sister     Breast cancer Sister 48    Breast cancer Maternal Grandmother         70s    Lung cancer Paternal Grandmother     Breast cancer Paternal Aunt         60-70s    Esophageal cancer Maternal Grandfather         90s       Social History:   Social History     Socioeconomic History    Marital status:      Spouse name: Gus    Number of children: 2    Years of education: College   Tobacco Use    Smoking status: Never     Passive exposure: Never    Smokeless tobacco: Never   Substance and Sexual Activity    Alcohol use: Yes     Comment: beer occasionally    Drug use: No    Sexual activity: Defer     Patient drinks 2 servings of caffeine per day.       GYNECOLOGIC HISTORY:   . P: 2. AB: 0.  Last menstrual period:   Age at menarche: 12  Age at first childbirth: 23  Lactation/How long: na  Age at menopause: 32  Total years of oral contraceptive use: 20  Total years of hormone replacement therapy: na      Physical Exam  There were no vitals filed for this visit.    ECOG 0 - Asymptomatic  General: NAD, well appearing  Psych: a&o x 3, normal mood and affect  Eyes: EOMI, no scleral icterus  ENMT: neck supple without masses or thyromegaly, mucus membranes moist  Resp: normal effort, CTAB  CV: RRR, no murmurs, no edema   GI: soft, NT, ND  MSK:  normal gait, normal ROM in bilateral shoulders  Lymph nodes:  no cervical, supraclavicular or axillary lymphadenopathy  Breast: symmetric, very large  Right:  No visible abnormalities on inspection while seated, with arms raised or hands on hips. No masses or nipple abnormalities. Nipple inverted. 9:00 14cmfn there is a small red open wound.  She also has them under her arm and scattered on right side. She states that she has a history of this off and on   Left:  No visible abnormalities on inspection while seated, with arms raised or hands on hips. No masses or nipple abnormalities. Nipple inverted. 5:30 4 cmfn a healing open red area measuring 3mm with no underlaying mass.     Imagin2023 breast MRI at [facility]  FINDINGS: The breasts are largely composed of fatty soft tissue. Mild  background parenchymal enhancement of both breasts is noted.  There has been interval resolution of a previously described skin lesion  in the axillary tail region of the right breast. Additionally, there has  been interval considerable decrease in enhancement of the previously  noted area of irregular linear enhancement in the posterior one-third of  the right breast at the 5 o'clock position. At this location there is a  focal signal void which corresponds to a biopsy clip at the previously  biopsy site which returned as benign.  No areas of suspicious enhancement or morphology are seen in the right  breast. I see no evidence for abnormal right breast skin, nipple or  chest wall enhancement and there is no evidence for right axillary or  internal mammary chain adenopathy.  There are no areas of suspicious enhancement or morphology in the left  breast. I see no evidence for abnormal left breast skin, nipple or chest  wall enhancement and there is no evidence for left axillary or internal  mammary chain adenopathy.  IMPRESSION:  There are no findings suspicious for malignancy in either  breast. Correlation with routine mammography is  recommended.  BI-RADS CATEGORY 1: Negative.    2/7/2024 bilateral screening mammogram at [facility]  FINDINGS: Bilateral digital CC and MLO mammographic and digital  Tomosynthesis images were obtained. Comparison is made to prior studies  dated 2/6/2023, 8/23/2022 and 2/2/2022. The parenchyma of both breasts  is largely fatty-replaced. I see no new or dominant masses, areas of  architectural distortion or skin thickening. There is no evidence for  axillary lymphadenopathy or nipple retraction.  IMPRESSION:  1. There is no evidence for malignancy or significant change in either  breast. Routine followup mammography is recommended.  BI-RADS category 1    Assessment:    1)  44 y.o. F with a new diagnosis of micropapilloma  2) family history of breast cancer  3) high risk for breast cancer        Discussion:  1. Papilloma was discussed with pt.  Benign process and no excisional surgeon required at this time.  Papilloma was small in size.  2. We discussed management options for individuals who are at increased risk (>20% lifetime risk):  1) High risk screening - Annual mammogram and annual breast MRI, alternating one test every 6 months, biannual clinical exam and monthly self breast exam. She meets criteria for high risk screening.  2) Chemoprevention with Tamoxifen, Raloxifene or Exemestane. These may reduce risk up to 50%. I reviewed that these particular medications are not without risks and the risk/benefit ratio must be considered carefully. She is not interested in this currently.  3) Risk reducing surgery such as prophylactic mastectomy  which may reduce risk by 90-95%. We discussed that this is a relatively radical strategy and is generally reserved for individuals with a known genetic mutation predisposing them to an increased risk (~50% risk) of breast cancer. She does not meet criteria for risk reducing surgery.   4) I discussed the importance of exercise and weight management as part of a risk reducing strategy,  since increased BMI is associated with an increased risk of breast cancer. This is especially true in her case, as I expect her risk would decrease as she lost weight.    Plan:    1.  Andrei dx mammo and left limited us in feb followed by exam  2. Doxy for skin infection  3.  Waiting to do bilateral diagnostic mammogram at the time of her screening to give the areas time to heal.  If the antibiotic does not work or if the areas get worse she is to call the office for an appointment and imaging        MARY LOU Parrish    I have spent 30 min in face to face time with the patient and     CC:  MD Nikole Felix, Miguel E    EMR Dragon/transcription disclaimer:  Dictated using Dragon dictation

## 2024-10-01 ENCOUNTER — OFFICE VISIT (OUTPATIENT)
Dept: SURGERY | Facility: CLINIC | Age: 44
End: 2024-10-01
Payer: COMMERCIAL

## 2024-10-01 VITALS
BODY MASS INDEX: 41.44 KG/M2 | SYSTOLIC BLOOD PRESSURE: 138 MMHG | HEART RATE: 99 BPM | DIASTOLIC BLOOD PRESSURE: 84 MMHG | HEIGHT: 67 IN | WEIGHT: 264 LBS | OXYGEN SATURATION: 99 %

## 2024-10-01 DIAGNOSIS — Z91.89 AT HIGH RISK FOR BREAST CANCER: ICD-10-CM

## 2024-10-01 DIAGNOSIS — D36.9 PAPILLOMA: ICD-10-CM

## 2024-10-01 DIAGNOSIS — Z80.3 FAMILY HISTORY OF BREAST CANCER: ICD-10-CM

## 2024-10-01 DIAGNOSIS — R92.8 ABNORMAL MRI, BREAST: ICD-10-CM

## 2024-10-01 DIAGNOSIS — L73.9 FOLLICULITIS: ICD-10-CM

## 2024-10-01 DIAGNOSIS — R92.8 ABNORMAL MAMMOGRAM: Primary | ICD-10-CM

## 2024-10-01 PROCEDURE — 99214 OFFICE O/P EST MOD 30 MIN: CPT | Performed by: NURSE PRACTITIONER

## 2024-10-01 RX ORDER — DOXYCYCLINE 100 MG/1
100 CAPSULE ORAL 2 TIMES DAILY
Qty: 20 CAPSULE | Refills: 0 | Status: SHIPPED | OUTPATIENT
Start: 2024-10-01

## 2024-11-06 NOTE — NURSING NOTE
Dr. David in with patient consented and site marked. All patient questions addressed.   [Nocturia] : nocturia [Negative] : Endocrine

## 2025-05-01 DIAGNOSIS — R92.8 ABNORMAL MRI, BREAST: Primary | ICD-10-CM

## 2025-05-01 DIAGNOSIS — Z91.89 AT HIGH RISK FOR BREAST CANCER: Primary | ICD-10-CM
